# Patient Record
Sex: MALE | Race: OTHER | HISPANIC OR LATINO | Employment: FULL TIME | URBAN - METROPOLITAN AREA
[De-identification: names, ages, dates, MRNs, and addresses within clinical notes are randomized per-mention and may not be internally consistent; named-entity substitution may affect disease eponyms.]

---

## 2019-09-04 ENCOUNTER — HOSPITAL ENCOUNTER (EMERGENCY)
Facility: HOSPITAL | Age: 49
Discharge: HOME | End: 2019-09-04
Attending: EMERGENCY MEDICINE

## 2019-09-04 VITALS
DIASTOLIC BLOOD PRESSURE: 106 MMHG | TEMPERATURE: 98 F | SYSTOLIC BLOOD PRESSURE: 163 MMHG | HEART RATE: 82 BPM | RESPIRATION RATE: 18 BRPM | WEIGHT: 185.63 LBS | OXYGEN SATURATION: 97 %

## 2019-09-04 DIAGNOSIS — M62.838 TRAPEZIUS MUSCLE SPASM: Primary | ICD-10-CM

## 2019-09-04 PROCEDURE — 99283 EMERGENCY DEPT VISIT LOW MDM: CPT

## 2019-09-04 PROCEDURE — 99284 EMERGENCY DEPT VISIT MOD MDM: CPT | Performed by: EMERGENCY MEDICINE

## 2019-09-04 PROCEDURE — 20552 NJX 1/MLT TRIGGER POINT 1/2: CPT | Performed by: EMERGENCY MEDICINE

## 2019-09-04 RX ORDER — CYCLOBENZAPRINE HCL 10 MG
10 TABLET ORAL ONCE
Status: COMPLETED | OUTPATIENT
Start: 2019-09-04 | End: 2019-09-04

## 2019-09-04 RX ORDER — BUPIVACAINE HYDROCHLORIDE 5 MG/ML
5 INJECTION, SOLUTION EPIDURAL; INTRACAUDAL ONCE
Status: COMPLETED | OUTPATIENT
Start: 2019-09-04 | End: 2019-09-04

## 2019-09-04 RX ORDER — CYCLOBENZAPRINE HCL 10 MG
10 TABLET ORAL 2 TIMES DAILY PRN
Qty: 20 TABLET | Refills: 0 | Status: SHIPPED | OUTPATIENT
Start: 2019-09-04 | End: 2021-10-14 | Stop reason: ALTCHOICE

## 2019-09-04 RX ORDER — NAPROXEN 500 MG/1
500 TABLET ORAL 2 TIMES DAILY PRN
Qty: 15 TABLET | Refills: 0 | Status: SHIPPED | OUTPATIENT
Start: 2019-09-04 | End: 2020-11-20

## 2019-09-04 RX ORDER — LIDOCAINE HYDROCHLORIDE AND EPINEPHRINE 10; 10 MG/ML; UG/ML
20 INJECTION, SOLUTION INFILTRATION; PERINEURAL ONCE
Status: COMPLETED | OUTPATIENT
Start: 2019-09-04 | End: 2019-09-04

## 2019-09-04 RX ADMIN — BUPIVACAINE HYDROCHLORIDE 5 ML: 5 INJECTION, SOLUTION EPIDURAL; INTRACAUDAL; PERINEURAL at 09:44

## 2019-09-04 RX ADMIN — CYCLOBENZAPRINE HYDROCHLORIDE 10 MG: 10 TABLET, FILM COATED ORAL at 09:41

## 2019-09-04 RX ADMIN — LIDOCAINE HYDROCHLORIDE,EPINEPHRINE BITARTRATE 20 ML: 10; .01 INJECTION, SOLUTION INFILTRATION; PERINEURAL at 09:41

## 2019-09-04 NOTE — ED PROVIDER NOTES
History  Chief Complaint   Patient presents with    Neck Pain     complains of neck pain with radiation to back of head on left side X 3 days, denies any injury     62-year-old male presents chief complaint of left-sided neck pain radiating to the back of his head for the past 3 days  Patient reports he woke up from sleeping this way and has not had any significant relief since then  Patient has been taking ibuprofen at home without improvement  Pain is worse at night and after waking up in the morning  No fevers, chills or skin changes  No recent injury  Patient reports he had similar symptoms several years ago which resolved on their own  No weakness or numbness  History provided by:  Patient   used: No    Neck Pain   Pain location:  L side  Quality:  Aching and stiffness  Stiffness is present: At night  Pain severity:  Moderate  Pain is: Worse during the night  Onset quality:  Sudden  Duration:  3 days  Timing:  Constant  Progression:  Unchanged  Chronicity:  New  Relieved by:  Nothing  Worsened by:  Twisting  Ineffective treatments:  NSAIDs  Associated symptoms: no chest pain, no fever, no numbness, no paresis and no tingling    Risk factors: no recent head injury        None       Past Medical History:   Diagnosis Date    Hypertension        History reviewed  No pertinent surgical history  History reviewed  No pertinent family history  I have reviewed and agree with the history as documented  Social History     Tobacco Use    Smoking status: Never Smoker    Smokeless tobacco: Never Used   Substance Use Topics    Alcohol use: Never     Frequency: Never    Drug use: Never        Review of Systems   Constitutional: Negative for chills, diaphoresis and fever  Respiratory: Negative for shortness of breath  Cardiovascular: Negative for chest pain and palpitations  Gastrointestinal: Negative for diarrhea, nausea and vomiting     Genitourinary: Negative for dysuria and frequency  Musculoskeletal: Positive for neck pain  Skin: Negative for rash  Neurological: Negative for tingling and numbness  All other systems reviewed and are negative  Physical Exam  Physical Exam   Constitutional: He is oriented to person, place, and time  He appears well-developed and well-nourished  He appears distressed  HENT:   Head: Normocephalic and atraumatic  No pain with percussion of left mastoid process   Eyes: Pupils are equal, round, and reactive to light  EOM are normal    Neck: Normal range of motion  No JVD present  Cardiovascular: Normal rate, regular rhythm, normal heart sounds and intact distal pulses  Exam reveals no gallop and no friction rub  No murmur heard  Pulmonary/Chest: Effort normal and breath sounds normal  No respiratory distress  He has no wheezes  He has no rales  He exhibits no tenderness  Musculoskeletal: Normal range of motion  He exhibits tenderness (occipital origin of left trapezius muscle)  Neurological: He is alert and oriented to person, place, and time  Skin: Skin is warm and dry  Psychiatric: He has a normal mood and affect  His behavior is normal  Judgment and thought content normal    Nursing note and vitals reviewed        Vital Signs  ED Triage Vitals [09/04/19 0917]   Temperature Pulse Respirations Blood Pressure SpO2   98 °F (36 7 °C) 82 18 (!) 163/106 97 %      Temp src Heart Rate Source Patient Position - Orthostatic VS BP Location FiO2 (%)   -- -- -- -- --      Pain Score       9           Vitals:    09/04/19 0917   BP: (!) 163/106   Pulse: 82         Visual Acuity      ED Medications  Medications   lidocaine-epinephrine (XYLOCAINE/EPINEPHRINE) 1 %-1:100,000 injection 20 mL (20 mL Infiltration Given 9/4/19 0941)   cyclobenzaprine (FLEXERIL) tablet 10 mg (10 mg Oral Given 9/4/19 0941)   bupivacaine (PF) (MARCAINE) 0 5 % injection 5 mL (5 mL Injection Given 9/4/19 0944)       Diagnostic Studies  Results Reviewed     None No orders to display              Procedures  Trigger Point Injection  Date/Time: 9/4/2019 10:00 AM  Performed by: Lance Dinero MD  Authorized by: Lance Dinero MD     Patient location:  ED and bedside  Assisting Provider(s): No    Consent:     Consent obtained:  Verbal    Consent given by:  Patient    Risks discussed:  Pain    Alternatives discussed:  Alternative treatment  Indications:     Indications:  Trapezius Muscle Spasm and pain  Pre-procedure details:     Skin preparation:  Antiseptic wash  Anesthesia (see MAR for exact dosages): Anesthesia method:  Local infiltration    Local anesthetic:  Lidocaine 1% WITH epi and bupivacaine 0 5% w/o epi  Procedure Detail:     Equipment used:  10 ml syringe, 27 guage needle    Procedure note (site, laterality, method, findings): After prepping the skin with antiseptic wash pad, a wheal was raised utilizing a 27 gauge needle and syringe with a mixture of 80% 0 5% bupivacaine and 20% 1% lidocaine  After the wheal was raised approximately 4 mL of this solution was infiltrated into the area of greatest tenderness at the occipital origin of the left trapezius muscle  As needle was advanced gentle traction was applied to the plunger to assure no vascular injury  Patient tolerated procedure well with no complications  ED Course                               MDM  Number of Diagnoses or Management Options  Trapezius muscle spasm: new and does not require workup  Diagnosis management comments: Patient presents with signs symptoms of trapezius muscle strain/torticollis  Will do trigger point injection and prescribed muscle relaxant and NSAID therapy for home  Will give patient stretching recommendations      Risk of Complications, Morbidity, and/or Mortality  Management options: high    Patient Progress  Patient progress: improved      Disposition  Final diagnoses:   Trapezius muscle spasm     Time reflects when diagnosis was documented in both MDM as applicable and the Disposition within this note     Time User Action Codes Description Comment    9/4/2019 10:04 AM Jesusitadalton Jackie Add [[id number]] Trapezius muscle spasm       ED Disposition     ED Disposition Condition Date/Time Comment    Discharge Stable Wed Sep 4, 2019 10:04 AM Anup Patterson discharge to home/self care  Follow-up Information    None         Discharge Medication List as of 9/4/2019 10:05 AM      START taking these medications    Details   cyclobenzaprine (FLEXERIL) 10 mg tablet Take 1 tablet (10 mg total) by mouth 2 (two) times a day as needed for muscle spasms for up to 10 days, Starting Wed 9/4/2019, Until Sat 9/14/2019, Normal      naproxen (NAPROSYN) 500 mg tablet Take 1 tablet (500 mg total) by mouth 2 (two) times a day as needed for mild pain for up to 15 doses, Starting Wed 9/4/2019, Normal           No discharge procedures on file      ED Provider  Electronically Signed by           Julieta Chery MD  09/04/19 7524

## 2020-08-24 ENCOUNTER — TELEPHONE (OUTPATIENT)
Dept: FAMILY MEDICINE CLINIC | Facility: CLINIC | Age: 50
End: 2020-08-24

## 2020-08-24 NOTE — TELEPHONE ENCOUNTER
Called pt to confirm appt tomorrow L/M with wife to return my call    [facility]

## 2020-08-25 ENCOUNTER — OFFICE VISIT (OUTPATIENT)
Dept: FAMILY MEDICINE CLINIC | Facility: CLINIC | Age: 50
End: 2020-08-25

## 2020-08-25 VITALS
BODY MASS INDEX: 26.05 KG/M2 | OXYGEN SATURATION: 97 % | SYSTOLIC BLOOD PRESSURE: 130 MMHG | WEIGHT: 182 LBS | HEIGHT: 70 IN | HEART RATE: 80 BPM | DIASTOLIC BLOOD PRESSURE: 80 MMHG | RESPIRATION RATE: 16 BRPM | TEMPERATURE: 98.1 F

## 2020-08-25 DIAGNOSIS — Z02.4 ENCOUNTER FOR COMMERCIAL DRIVER MEDICAL EXAMINATION (CDME): Primary | ICD-10-CM

## 2020-08-25 LAB
SL AMB  POCT GLUCOSE, UA: NORMAL
SL AMB LEUKOCYTE ESTERASE,UA: NORMAL
SL AMB POCT BILIRUBIN,UA: NORMAL
SL AMB POCT BLOOD,UA: NORMAL
SL AMB POCT CLARITY,UA: CLEAR
SL AMB POCT COLOR,UA: YELLOW
SL AMB POCT KETONES,UA: NORMAL
SL AMB POCT NITRITE,UA: NORMAL
SL AMB POCT PH,UA: 5
SL AMB POCT SPECIFIC GRAVITY,UA: 1.01
SL AMB POCT URINE PROTEIN: NORMAL
SL AMB POCT UROBILINOGEN: NORMAL

## 2020-08-25 PROCEDURE — 81002 URINALYSIS NONAUTO W/O SCOPE: CPT | Performed by: NURSE PRACTITIONER

## 2020-08-25 PROCEDURE — 3008F BODY MASS INDEX DOCD: CPT | Performed by: NURSE PRACTITIONER

## 2020-08-25 PROCEDURE — 99499 UNLISTED E&M SERVICE: CPT | Performed by: NURSE PRACTITIONER

## 2020-08-25 RX ORDER — LISINOPRIL 10 MG/1
10 TABLET ORAL DAILY
COMMUNITY
End: 2020-11-20 | Stop reason: SDUPTHER

## 2020-08-25 NOTE — PROGRESS NOTES
BMI Counseling: Body mass index is 26 11 kg/m²  The BMI is above normal  Nutrition recommendations include decreasing portion sizes, encouraging healthy choices of fruits and vegetables, decreasing fast food intake, consuming healthier snacks, limiting drinks that contain sugar, moderation in carbohydrate intake, increasing intake of lean protein, reducing intake of saturated and trans fat and reducing intake of cholesterol  Exercise recommendations include vigorous physical activity 75 minutes/week, exercising 3-5 times per week, obtaining a gym membership and strength training exercises  No pharmacotherapy was ordered  BMI Counseling: Body mass index is 26 11 kg/m²  Follow-up plan was not completed due to patient refusing BMI follow-up plan  Body mass index is 26 11 kg/m²  Follow-up plan was not completed due to patient being in urgent or emergent medical situation  Depression Screening and Follow-up Plan: Patient not screened for depression due to medical reason (urgent/emergent situation, decreased capacity)  Assessment/Plan:         Problem List Items Addressed This Visit        Other    Encounter for  medical examination (CDME) - Primary    Relevant Orders    POCT urine dip (Completed)            Subjective:      Patient ID: Yobany Perez is a 53 y o  male  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       The following portions of the patient's history were reviewed and updated as appropriate:   He has a past medical history of Hypertension  ,  does not have any pertinent problems on file  ,   has no past surgical history on file  ,  family history is not on file  ,   reports that he has never smoked  He has never used smokeless tobacco  He reports that he does not drink alcohol or use drugs  ,  has No Known Allergies     Current Outpatient Medications   Medication Sig Dispense Refill    lisinopril (ZESTRIL) 10 mg tablet Take 10 mg by mouth daily      cyclobenzaprine (FLEXERIL) 10 mg tablet Take 1 tablet (10 mg total) by mouth 2 (two) times a day as needed for muscle spasms for up to 10 days 20 tablet 0    naproxen (NAPROSYN) 500 mg tablet Take 1 tablet (500 mg total) by mouth 2 (two) times a day as needed for mild pain for up to 15 doses (Patient not taking: Reported on 8/25/2020) 15 tablet 0     No current facility-administered medications for this visit  Review of Systems   Constitutional: Negative for appetite change, chills, fatigue and fever  HENT: Negative for congestion, ear pain, postnasal drip, rhinorrhea, sinus pain and sore throat  Eyes: Negative for pain, redness and visual disturbance  Respiratory: Negative for cough and shortness of breath  Cardiovascular: Negative for chest pain  Gastrointestinal: Negative for abdominal pain, diarrhea, nausea and vomiting  Genitourinary: Negative for dysuria and hematuria  Musculoskeletal: Negative for arthralgias  Skin: Negative  Allergic/Immunologic: Negative for environmental allergies and food allergies  Neurological: Negative  Hematological: Negative  Psychiatric/Behavioral: Negative  Objective:  Vitals:    08/25/20 1011   BP: 130/80   BP Location: Left arm   Patient Position: Sitting   Cuff Size: Standard   Pulse: 80   Resp: 16   Temp: 98 1 °F (36 7 °C)   TempSrc: Temporal   SpO2: 97%   Weight: 82 6 kg (182 lb)   Height: 5' 10" (1 778 m)     Body mass index is 26 11 kg/m²  Physical Exam  Vitals signs and nursing note reviewed  Constitutional:       Appearance: Normal appearance  He is well-developed and normal weight  HENT:      Head: Normocephalic and atraumatic  Right Ear: Tympanic membrane, ear canal and external ear normal       Left Ear: Tympanic membrane, ear canal and external ear normal       Mouth/Throat:      Mouth: Mucous membranes are moist    Eyes:      Extraocular Movements: Extraocular movements intact        Conjunctiva/sclera: Conjunctivae normal  Pupils: Pupils are equal, round, and reactive to light  Neck:      Musculoskeletal: Normal range of motion  Cardiovascular:      Rate and Rhythm: Normal rate and regular rhythm  Pulses: Normal pulses  Heart sounds: Normal heart sounds  No murmur  Pulmonary:      Effort: Pulmonary effort is normal       Breath sounds: Normal breath sounds  Abdominal:      General: Bowel sounds are normal       Palpations: Abdomen is soft  Musculoskeletal: Normal range of motion  Skin:     General: Skin is warm  Capillary Refill: Capillary refill takes less than 2 seconds  Neurological:      Mental Status: He is alert and oriented to person, place, and time     Psychiatric:         Mood and Affect: Mood normal          Behavior: Behavior normal

## 2020-08-25 NOTE — PATIENT INSTRUCTIONS
Medical Examiner's Certificate    I certify that I have examined Courtney Wells  In accordance with the [employer] (01   97) and with knowledge of the driving duties, I find this person is qualified; and, if applicable, only when:      Wearing corrective lenses  The information I have provided regarding this physical examination is true and complete   A complete examination form with any attachment embodies my findings completely and correctly, and is on file in my office       _________________________________________  ALYSE Gould  8/25/2020    __________________________________________ _______________________ _____   Signature of  s License No  State       Address of   [address] [place] [address] Date  8/25/2021

## 2020-11-20 ENCOUNTER — OFFICE VISIT (OUTPATIENT)
Dept: FAMILY MEDICINE CLINIC | Facility: OTHER | Age: 50
End: 2020-11-20
Payer: COMMERCIAL

## 2020-11-20 VITALS
DIASTOLIC BLOOD PRESSURE: 100 MMHG | HEIGHT: 69 IN | HEART RATE: 62 BPM | BODY MASS INDEX: 27.11 KG/M2 | SYSTOLIC BLOOD PRESSURE: 150 MMHG | TEMPERATURE: 97.6 F | WEIGHT: 183 LBS

## 2020-11-20 DIAGNOSIS — Z13.220 SCREENING, LIPID: Primary | ICD-10-CM

## 2020-11-20 DIAGNOSIS — I10 ESSENTIAL HYPERTENSION: ICD-10-CM

## 2020-11-20 DIAGNOSIS — Z11.4 SCREENING FOR HIV (HUMAN IMMUNODEFICIENCY VIRUS): ICD-10-CM

## 2020-11-20 DIAGNOSIS — Z13.29 SCREENING FOR THYROID DISORDER: ICD-10-CM

## 2020-11-20 DIAGNOSIS — Z13.1 SCREENING FOR DIABETES MELLITUS: ICD-10-CM

## 2020-11-20 PROCEDURE — 3077F SYST BP >= 140 MM HG: CPT | Performed by: FAMILY MEDICINE

## 2020-11-20 PROCEDURE — 3008F BODY MASS INDEX DOCD: CPT | Performed by: FAMILY MEDICINE

## 2020-11-20 PROCEDURE — 1036F TOBACCO NON-USER: CPT | Performed by: FAMILY MEDICINE

## 2020-11-20 PROCEDURE — 3080F DIAST BP >= 90 MM HG: CPT | Performed by: FAMILY MEDICINE

## 2020-11-20 PROCEDURE — 99203 OFFICE O/P NEW LOW 30 MIN: CPT | Performed by: FAMILY MEDICINE

## 2020-11-20 RX ORDER — LISINOPRIL 10 MG/1
10 TABLET ORAL DAILY
Qty: 90 TABLET | Refills: 0 | Status: SHIPPED | OUTPATIENT
Start: 2020-11-20 | End: 2021-02-22 | Stop reason: SDUPTHER

## 2020-12-28 ENCOUNTER — LAB (OUTPATIENT)
Dept: LAB | Facility: CLINIC | Age: 50
End: 2020-12-28
Payer: COMMERCIAL

## 2020-12-28 ENCOUNTER — TRANSCRIBE ORDERS (OUTPATIENT)
Dept: LAB | Facility: CLINIC | Age: 50
End: 2020-12-28

## 2020-12-28 DIAGNOSIS — I10 ESSENTIAL HYPERTENSION: ICD-10-CM

## 2020-12-28 DIAGNOSIS — Z11.4 SCREENING FOR HIV (HUMAN IMMUNODEFICIENCY VIRUS): ICD-10-CM

## 2020-12-28 DIAGNOSIS — Z13.220 SCREENING, LIPID: ICD-10-CM

## 2020-12-28 DIAGNOSIS — Z13.29 SCREENING FOR THYROID DISORDER: ICD-10-CM

## 2020-12-28 DIAGNOSIS — Z13.1 SCREENING FOR DIABETES MELLITUS: ICD-10-CM

## 2020-12-28 LAB
ALBUMIN SERPL BCP-MCNC: 4 G/DL (ref 3.5–5)
ALP SERPL-CCNC: 89 U/L (ref 46–116)
ALT SERPL W P-5'-P-CCNC: 65 U/L (ref 12–78)
ANION GAP SERPL CALCULATED.3IONS-SCNC: 6 MMOL/L (ref 4–13)
AST SERPL W P-5'-P-CCNC: 18 U/L (ref 5–45)
BASOPHILS # BLD AUTO: 0.14 THOUSANDS/ΜL (ref 0–0.1)
BASOPHILS NFR BLD AUTO: 2 % (ref 0–1)
BILIRUB SERPL-MCNC: 0.28 MG/DL (ref 0.2–1)
BUN SERPL-MCNC: 12 MG/DL (ref 5–25)
CALCIUM SERPL-MCNC: 9.1 MG/DL (ref 8.3–10.1)
CHLORIDE SERPL-SCNC: 101 MMOL/L (ref 100–108)
CHOLEST SERPL-MCNC: 256 MG/DL (ref 50–200)
CO2 SERPL-SCNC: 30 MMOL/L (ref 21–32)
CREAT SERPL-MCNC: 0.96 MG/DL (ref 0.6–1.3)
EOSINOPHIL # BLD AUTO: 0.36 THOUSAND/ΜL (ref 0–0.61)
EOSINOPHIL NFR BLD AUTO: 4 % (ref 0–6)
ERYTHROCYTE [DISTWIDTH] IN BLOOD BY AUTOMATED COUNT: 12.4 % (ref 11.6–15.1)
EST. AVERAGE GLUCOSE BLD GHB EST-MCNC: 117 MG/DL
GFR SERPL CREATININE-BSD FRML MDRD: 92 ML/MIN/1.73SQ M
GLUCOSE P FAST SERPL-MCNC: 101 MG/DL (ref 65–99)
HBA1C MFR BLD: 5.7 %
HCT VFR BLD AUTO: 50.2 % (ref 36.5–49.3)
HDLC SERPL-MCNC: 50 MG/DL
HGB BLD-MCNC: 16.3 G/DL (ref 12–17)
IMM GRANULOCYTES # BLD AUTO: 0.06 THOUSAND/UL (ref 0–0.2)
IMM GRANULOCYTES NFR BLD AUTO: 1 % (ref 0–2)
LDLC SERPL CALC-MCNC: 170 MG/DL (ref 0–100)
LYMPHOCYTES # BLD AUTO: 2.58 THOUSANDS/ΜL (ref 0.6–4.47)
LYMPHOCYTES NFR BLD AUTO: 27 % (ref 14–44)
MCH RBC QN AUTO: 28.7 PG (ref 26.8–34.3)
MCHC RBC AUTO-ENTMCNC: 32.5 G/DL (ref 31.4–37.4)
MCV RBC AUTO: 88 FL (ref 82–98)
MONOCYTES # BLD AUTO: 1.03 THOUSAND/ΜL (ref 0.17–1.22)
MONOCYTES NFR BLD AUTO: 11 % (ref 4–12)
NEUTROPHILS # BLD AUTO: 5.45 THOUSANDS/ΜL (ref 1.85–7.62)
NEUTS SEG NFR BLD AUTO: 55 % (ref 43–75)
NONHDLC SERPL-MCNC: 206 MG/DL
NRBC BLD AUTO-RTO: 0 /100 WBCS
PLATELET # BLD AUTO: 262 THOUSANDS/UL (ref 149–390)
PMV BLD AUTO: 10.5 FL (ref 8.9–12.7)
POTASSIUM SERPL-SCNC: 4.8 MMOL/L (ref 3.5–5.3)
PROT SERPL-MCNC: 8.1 G/DL (ref 6.4–8.2)
RBC # BLD AUTO: 5.68 MILLION/UL (ref 3.88–5.62)
SODIUM SERPL-SCNC: 137 MMOL/L (ref 136–145)
TRIGL SERPL-MCNC: 180 MG/DL
TSH SERPL DL<=0.05 MIU/L-ACNC: 0.68 UIU/ML (ref 0.36–3.74)
WBC # BLD AUTO: 9.62 THOUSAND/UL (ref 4.31–10.16)

## 2020-12-28 PROCEDURE — 84443 ASSAY THYROID STIM HORMONE: CPT

## 2020-12-28 PROCEDURE — 85025 COMPLETE CBC W/AUTO DIFF WBC: CPT

## 2020-12-28 PROCEDURE — 87389 HIV-1 AG W/HIV-1&-2 AB AG IA: CPT

## 2020-12-28 PROCEDURE — 80061 LIPID PANEL: CPT

## 2020-12-28 PROCEDURE — 80053 COMPREHEN METABOLIC PANEL: CPT

## 2020-12-28 PROCEDURE — 36415 COLL VENOUS BLD VENIPUNCTURE: CPT

## 2020-12-28 PROCEDURE — 83036 HEMOGLOBIN GLYCOSYLATED A1C: CPT

## 2020-12-29 ENCOUNTER — TELEPHONE (OUTPATIENT)
Dept: FAMILY MEDICINE CLINIC | Facility: OTHER | Age: 50
End: 2020-12-29

## 2020-12-29 LAB — HIV 1+2 AB+HIV1 P24 AG SERPL QL IA: NORMAL

## 2020-12-29 NOTE — RESULT ENCOUNTER NOTE
Can we please inform patient that lab work looks stable  Blood counts, kidney, liver and thyroid function are all normal  Hemoglobin A1C is 5 7, Prediabetic range and cholesterol levels elevated all overall  LDL "bad cholesterol" is high  I would like patient to begin with lifestyle modifications including healthy eating 3-5 servings/day of fruits and vegetables, as well as, exercise 150mins/week  Please have patient make an appointment for 4-6 months, to check-in and see his progress  Additionally, patient was supposed to call the office back, 2 weeks after his previous appointment to let me know how his blood pressure readings were  Can we also check-in about this? Thanks so much!   Dr Benedict Hand

## 2021-02-22 DIAGNOSIS — I10 ESSENTIAL HYPERTENSION: ICD-10-CM

## 2021-02-26 RX ORDER — LISINOPRIL 10 MG/1
10 TABLET ORAL DAILY
Qty: 90 TABLET | Refills: 0 | Status: SHIPPED | OUTPATIENT
Start: 2021-02-26 | End: 2021-05-24 | Stop reason: SDUPTHER

## 2021-04-06 DIAGNOSIS — Z23 ENCOUNTER FOR IMMUNIZATION: ICD-10-CM

## 2021-04-14 ENCOUNTER — IMMUNIZATIONS (OUTPATIENT)
Dept: FAMILY MEDICINE CLINIC | Facility: HOSPITAL | Age: 51
End: 2021-04-14

## 2021-04-14 DIAGNOSIS — Z23 ENCOUNTER FOR IMMUNIZATION: Primary | ICD-10-CM

## 2021-04-14 PROCEDURE — 91300 SARS-COV-2 / COVID-19 MRNA VACCINE (PFIZER-BIONTECH) 30 MCG: CPT

## 2021-04-14 PROCEDURE — 0001A SARS-COV-2 / COVID-19 MRNA VACCINE (PFIZER-BIONTECH) 30 MCG: CPT

## 2021-05-09 ENCOUNTER — IMMUNIZATIONS (OUTPATIENT)
Dept: FAMILY MEDICINE CLINIC | Facility: HOSPITAL | Age: 51
End: 2021-05-09

## 2021-05-09 DIAGNOSIS — Z23 ENCOUNTER FOR IMMUNIZATION: Primary | ICD-10-CM

## 2021-05-09 PROCEDURE — 0002A SARS-COV-2 / COVID-19 MRNA VACCINE (PFIZER-BIONTECH) 30 MCG: CPT

## 2021-05-09 PROCEDURE — 91300 SARS-COV-2 / COVID-19 MRNA VACCINE (PFIZER-BIONTECH) 30 MCG: CPT

## 2021-05-24 DIAGNOSIS — I10 ESSENTIAL HYPERTENSION: ICD-10-CM

## 2021-05-27 RX ORDER — LISINOPRIL 10 MG/1
10 TABLET ORAL DAILY
Qty: 90 TABLET | Refills: 0 | Status: SHIPPED | OUTPATIENT
Start: 2021-05-27 | End: 2021-08-30 | Stop reason: SDUPTHER

## 2021-07-19 ENCOUNTER — OFFICE VISIT (OUTPATIENT)
Dept: FAMILY MEDICINE CLINIC | Facility: OTHER | Age: 51
End: 2021-07-19
Payer: COMMERCIAL

## 2021-07-19 VITALS
HEART RATE: 74 BPM | WEIGHT: 186.6 LBS | OXYGEN SATURATION: 98 % | DIASTOLIC BLOOD PRESSURE: 82 MMHG | BODY MASS INDEX: 27.64 KG/M2 | SYSTOLIC BLOOD PRESSURE: 124 MMHG | TEMPERATURE: 97.8 F | HEIGHT: 69 IN

## 2021-07-19 DIAGNOSIS — I10 ESSENTIAL HYPERTENSION: Primary | ICD-10-CM

## 2021-07-19 DIAGNOSIS — Z12.12 SCREENING FOR COLORECTAL CANCER: ICD-10-CM

## 2021-07-19 DIAGNOSIS — R07.9 CHEST PAIN, UNSPECIFIED TYPE: ICD-10-CM

## 2021-07-19 DIAGNOSIS — E78.2 MODERATE MIXED HYPERLIPIDEMIA NOT REQUIRING STATIN THERAPY: ICD-10-CM

## 2021-07-19 DIAGNOSIS — Z12.11 SCREENING FOR COLORECTAL CANCER: ICD-10-CM

## 2021-07-19 PROCEDURE — 99213 OFFICE O/P EST LOW 20 MIN: CPT | Performed by: FAMILY MEDICINE

## 2021-07-19 PROCEDURE — 3079F DIAST BP 80-89 MM HG: CPT | Performed by: FAMILY MEDICINE

## 2021-07-19 PROCEDURE — 1036F TOBACCO NON-USER: CPT | Performed by: FAMILY MEDICINE

## 2021-07-19 PROCEDURE — 3725F SCREEN DEPRESSION PERFORMED: CPT | Performed by: FAMILY MEDICINE

## 2021-07-19 PROCEDURE — 3074F SYST BP LT 130 MM HG: CPT | Performed by: FAMILY MEDICINE

## 2021-07-19 PROCEDURE — 3008F BODY MASS INDEX DOCD: CPT | Performed by: FAMILY MEDICINE

## 2021-07-19 NOTE — PATIENT INSTRUCTIONS
Patient is to start walking 1-2x/week for at least 20 mins  DASH diet       Plan de alimentación con "enfoque dietético para detener la hipertensión (DASH, por ginger siglas en inglés)   LO QUE NECESITA SABER:   El plan de alimentación DASH está diseñado para ayudar a prevenir o disminuir la hipertensión  También puede ayudar a bajar el colesterol nikole (colesterol LDL) y disminuír bella riesgo de enfermedad cardíaca  El plan es bajo en sodio, azúcar, grasas dañinas, y grasas en bella totalidad  Es alto en potasio, calcio, magnesio y Ifeoma  Estos nutrientes se agregan al consumir más frutas, vegetales y granos enteros  INSTRUCCIONES SOBRE EL [facility]ITAL[facility]:   Bella límite de sodio cada día: Bella dietista le indicará la cantidad de sodio que usted debe consumir a diario  La gente que tiene la presión arterial ashley debe consumir de 1,500 a 2,300 mg de sodio al día brandy fadi  James cucharadita (cdta) de sal tiene 2,300 mg de sodio  [place] puede parecer brandy james meta difícil, en pequeños cambios en los alimentos que usted consume pueden hacer james gran diferencia  Bella médico o dietista puede ayudarlo a crear un plan alimenticio que cumpla bella límite de sodio  Formas de limitar el sodio:  · Tail las etiquetas de los alimentos  Las etiquetas pueden ayudarle a escoger alimentos bajos en sodio  La cantidad de sodio está incluida en miligramos (mg)  La columna del porcentaje de valor diario indica la cantidad de necesidades diarias satisfechas con 1 porción del alimento para cada nutriente en la lista  Escoja alimentos que tengan menos de 5% del porcentaje diario de [place]  Estos alimentos se consideran bajos en sodio  Los alimentos que tienen 20% o más del porcentaje diario de sodio se consideran alimentos altos en sodio  Evite alimentos que tengan más de 300 mg de sodio por porción  Escoja alimentos Shelli Spanner diga que son bajos en sodio, con sodio reducido, o sin sal agregada           · Evite la sal  No le agregue sal a la comida cuando se siente a la martinez a comer, y agregue muy poca sal a los alimentos tucker la cocción  Use hierbas y condimentos, brandy cebollas, ajo y especias sin sal para agregar sabor a los alimentos  Use jugo de lima, david o vinagre para darle a los alimentos un sabor ácido  Use chiles picantes o james cantidad pequeña de salsa picante para agregar un sabor picante a los alimentos  · Pregunte sobre los sustitutos para la sal  Pregúntele a bella médico si es posible usar sustitutos de la sal  Algunos sustitutos de la sal vienen con ingredientes que pueden ser dañinos si usted tiene ciertos padecimientos médicos  · Escoja los alimentos cuidadosamente cuando sale a comer a restaurantes: las comidas de los restaurantes, sobre todo restaurantes de comida rápida, suze siempre son altas en sodio  Algunos restaurantes ofrecen información nutricional que indica la cantidad de sodio en ginger alimentos  Pida que preparen ginger comidas con menos sal o sin sal     Lo que necesita saber acerca de las grasas:  · Incluya grasas saludables  Las grasas insaturadas y los ácidos grasos omega-3 son ejemplos de grasas saludables  Las grasas insaturadas se encuentran en los aceites de soja, canola, [place] o [place] y la margarina [place]n Olp y [facility]  Los ácidos grasos [place] 3 se encuentran en el pescado con grasa, brandy el salmón, el atún, la caballa y las adali  También se le puede encontrar en el aceita de linaza y en la linaza molida  · Evite las grasas insalubres  No consuma grasas dañinas, brandy grasas saturadas y grasas trans  Las grasas saturadas se encuentran en alimentos que contienen grasa animal  Lizz Jackhorn son Valentina Mustache grasosas, la [place], la [place], la crema y otros productos lácteos   Además se pueden encontrar en la [place], la margarina en zaida, el aceite de tony y el aceite de bhavana  Las grasas trans se encuentran en comidas fritas, galletas estilo soda, tortillitas tostadas, y alimentos horneados hechos con margarina o manteca  Alimentos que puede incluir: Con el plan de alimentación DASH, usted necesita consumir un número específico de porciones de cada jorge de alimentos  [place] le ayudará a consumir las cantidades suficientes de ciertos nutrientes y limitar otros  La cantidad de porciones que usted debe comer depende de la cantidad de calorías que usted necesita  [place] dietista le informará sobre cuántas calorías necesita usted  El número de porciones que viene en la lista al lado de los grupos alimenticios a continuación es para las personas que necesitan aproximadamente 2,000 calorías al día  · Granos: 6 a 8 porciones (3 de estas porciones deben ser de alimentos de granos enteros o integrales)    ? 1 tajada de pan integral    ? 1 onza de cereal seco    ? ½ taza de cereal cocinado, pasta, o arroz integral    · Verduras y frutas: 4 a 5 porciones de frutas y 4 a 5 porciones de vegetales    ? 1 fruta mediana    ? ½ taza de vegetales congelados, enlatados (sin sal adicional), o vegetales frescos y picados    ? ½ taza de fruta fresca, congelada, seca o enlatada (enlatada en sirope liviano o jugo de fruta)    ? ½ taza de jugo de verduras o frutas    · Productos lácteos: 2 a 3 porciones    ? [address] 1%    ? 1½ onzas de queso descremado o bajo en grasas y bajo en sodio    ? 6 onzas de yogurt descremado o bajo en grasas    · Awilda wolff, awilda yves y pescado magros: 6 onzas o menos    ? Awilda yves (michelle, pavo) sin piel    ? Pescado (sobre todo pescado con grasa, brandy salmón, atún fresco o [place])    ? Carne de res o de cerdo magra (Jennifer Devi extra Kyle Mariee)    ? Claras de huevo y sustitutos del huevo    · [place] du sac, semillas y legumbres: 4 a 5 porciones por semana    ? ½ taza de frijoles y arbejas cocinadas    ? 1½ onzas de nueces sin sal    ? 2 cucharadas de mantequilla de maní o semillas    · Dulces y azúcares adicionales: 5 o menos por semana    ?  1 cucharada de azúcar, Robert Gustafson    ? ½ taza de sorbeto o gelatina    ? 1 taza de limonada    · Grasas: 2 a 3 porciones por semana    ? 1 cucharadita de margarina suave o aceite vegetal    ? 1 cucharada de mayonesa    ? 2 cucharadas de aderezo para [place] [employer] se deben evitar:  · Granos:     ? Postres horneados o fritos brandy donas, pastelitos, galletas, y quequitos (altos en grasas y azúcar)    ? Mezclas para hacer pan de maíz y pasteles, alimentos empacados, brandy rellenos para pavo, mezclas para hacer arroz y pasta, macarrones con Brit-barre, y cereales instantáneos (altos en sodio)    · Anthony Brand y verduras:     ? Vegetales regulares enlatados (altos en sodio)    ? Repollo preparado en vinagre, vegetales en vinagre, y otros alimentos preparados en escabeche (altos en sodio)    ? Vegetales fritos o vegetales en mantequilla o salsas altas en grasas    ? Anthony Brand en crema o salsa de mantequilla (altas en grasas)    · Productos lácteos:     ? Leche entera, leche semi descremada (2%), y crema (tia en grasas)    ? Queso regular y [place]-barre procesado (alto en grasa y [place])    · Awilda y alimentos con proteínas:     ? Awilda ahumadas o curadas, brandy carne de keri en conserva, tocino, jamón, perros calientes, y salchicha (tia en grasas y [place])    ? Frijoles enlatados y awilda enlatadas o awilda en pasta, brandy awilda en conserva, adali, anchoas y mariscos de imitación (altos en sodio)    ? Awilda para emparedados, brandy Nadia, [place], [place], y carne de res en rebanada (altos en sodio)    ? Awilda altas en grasas (bistec estilo T-bone, carne molida para hamburguesas, costillas)    ? Huevos enteros y yemas de huevo (altos en grasas)    · Otros:     ? Condimentos hechos con sal, brandy sal de ajo, sal de apio, sal de cebolla, sal condimentada, suavizantes para awilda, y glutamato de monosodio (MSG, por ginger siglas en inglés)    ? Sopa Miso y mezclas para sopa enlatadas o secas (altas en sodio)    ?  Salsa de soya regular, salsa de [place], salsa [place], salsa para bistec, [address], y la mayoría de las vinagres con sabor (altas en sodio)    ? Condimentos regulares, brandy mostaza, salsa de tomate y aderezos para ensalada (altos en sodio)    ? Salsa para law y salsas en general, brandy salsa Mikal o de queso (altas en sodio y [place])    ? Bebidas altas en azúcar, brandy bebidas gaseosas o jugos de frutas    ? Alimentos para merendar, brandy pk tostadas, palomitas de [place], pretzels, piel de cerdo, galletas de soda saladas, y nueces saladas    ? Alimentos congelados, brandy cenas preparadas, platos principales, vegetales con salsas, y law cubiertas en pan (altas en sodio)    Otras pautas que debe seguir:  · Mantenga un peso saludable  Bella riesgo de enfermedad cardíaca es aún más alto si usted tiene sobrepeso  Bella médico podría sugerirle que adelgace si tiene sobrepeso  Usted puede perder peso si se propone consumir menos calorías y alimentos que tengan azúcar y grasas agregadas  El plan de alimentación DASH puede ayudarle a lograrlo  Alva buena forma de disminuir el consumo de calorías es consumiendo porciones más pequeñas en cada comida y menos meriendas entre comidas  Consulte a bella médico para obtener más información sobre cómo adelgazar  · Realice actividad física con regularidad  El ejercicio regular puede ayudarle a alcanzar o mantener un peso saludable  El ejercicio regular también puede ayudarle a disminuir bella presión arterial y mejorar ginger niveles de colesterol  Lauren ejercicios moderados por 30 minutos o más todos los días de la [place]  Para bajar peso, asegúrese de ejercitarse por lo menos 60 minutos  Consulte con bella médico sobre un programa de ejercicio adecuado para usted  · Limite el consumo de alcohol  Las mujeres deberían limitar el consumo de alcohol a 1 bebida por día  Los hombres deberían limitar el consumo de alcohol a 2 tragos al día   Un trago equivale a 12 onzas de cerveza, 5 onzas de vino o 1 onza y ½ de licor  © Copyright [address] [place] [address] Information is for End User's use only and may not be sold, redistributed or otherwise used for commercial purposes  All illustrations and images included in CareNotes® are the copyrighted property of A D A M , Inc  or [address] [place] [address] es sólo para uso en educación  Bella intención no es darle un consejo médico sobre enfermedades o tratamientos  Colsulte con bella Alejandro Shila farmacéutico antes de seguir cualquier régimen médico para saber si es seguro y efectivo para usted  Books:  "Salt, Sugar, Fat" - Srinivasan Najera    Videos:  "[employer]"    Websites:  www nutritionfacts  org  www  [employer]  com

## 2021-07-19 NOTE — PROGRESS NOTES
Assessment/Plan:     Diagnoses and all orders for this visit:    Essential hypertension  - Blood pressure well controlled  - Continue current regimen, 10mg lisinopril daily    Screening for colorectal cancer  -     Ambulatory referral to Gastroenterology; Future    Chest pain, unspecified type  - Patient reports episodes of chest tightness and SOB last week while walking up stairs  - Reports concern of CVD as he has a strong fhx of heart disease, will obtain baseline stress EKG to r/o underlying ischemia  - Stress test only, exercise; Future    Moderate mixed hyperlipidemia not requiring statin therapy  - Cholesterol levels elevated all overall  LDL "bad cholesterol" is high,  on recent lab work  - Discussed with patient to begin lifestyle modifications including healthy eating 3-5 servings/day of fruits and vegetables, as well as, exercise 150mins/week and is amenable     Nutrition Assessment and Intervention:     Online resources such as NutritionFacts  Ludmila Cheslea  com, plant[employer] com, [employer], or similar provided to patient    Nutrition-related book recommendations provided to patient    Nutrition-related movie recommendations provided to patient      Physical Activity Assessment and Intervention:    Physical activity online resources/apps provided to patient        Subjective:      Patient ID: Jamal Ferrer is a 49 y o  male  Hypertension  This is a chronic problem  The current episode started more than 1 year ago  The problem has been gradually improving since onset  The problem is controlled (120/80s)  Associated symptoms include chest pain and shortness of breath  Pertinent negatives include no blurred vision, headaches, palpitations or sweats  (Patient reports that last week he experienced an episode of generalized precordial chest tightness that occurred as he was walking up his stairs  Patient states he also had intermittent SOB during this episode    He states the episode resolved within 10 mins, and states he took a baby ASA  Patient denies any numbness, chest-pressure, or weakness of the left arm) There are no associated agents to hypertension  Risk factors: Prediabetes  Past treatments include ACE inhibitors and lifestyle changes  The current treatment provides moderate improvement  There is no history of CAD/MI or CVA  There is no history of a thyroid problem  Reviewed recent lab work with patient and discussed with him that Hemoglobin A1C is 5 7% which is prediabetes  Also discussed that his LDL or "bad cholesterol" was elevated  The following portions of the patient's history were reviewed and updated as appropriate: allergies, current medications, past family history, past medical history, past social history, past surgical history and problem list     Review of Systems   Constitutional: Negative for diaphoresis  Eyes: Negative for blurred vision and visual disturbance  Respiratory: Positive for shortness of breath  Cardiovascular: Positive for chest pain  Negative for palpitations  Gastrointestinal: Negative for constipation, diarrhea, nausea and vomiting  Endocrine: Negative for polydipsia, polyphagia and polyuria  Neurological: Negative for dizziness, weakness, numbness and headaches  Objective:      /82 (BP Location: Left arm, Patient Position: Sitting, Cuff Size: Adult)   Pulse 74   Temp 97 8 °F (36 6 °C) (Temporal)   Ht 5' 9" (1 753 m)   Wt 84 6 kg (186 lb 9 6 oz)   SpO2 98%   BMI 27 56 kg/m²          Physical Exam  Vitals reviewed  Constitutional:       Appearance: He is well-developed  HENT:      Head: Normocephalic and atraumatic  Nose: Nose normal    Eyes:      Conjunctiva/sclera: Conjunctivae normal    Cardiovascular:      Rate and Rhythm: Normal rate and regular rhythm  Heart sounds: Normal heart sounds  Pulmonary:      Effort: Pulmonary effort is normal       Breath sounds: Normal breath sounds  Abdominal:      General: Bowel sounds are normal       Palpations: Abdomen is soft  Musculoskeletal:         General: Normal range of motion  Cervical back: Normal range of motion and neck supple  Skin:     General: Skin is warm and dry  Neurological:      Mental Status: He is alert and oriented to person, place, and time

## 2021-07-20 ENCOUNTER — TELEPHONE (OUTPATIENT)
Dept: FAMILY MEDICINE CLINIC | Facility: OTHER | Age: 51
End: 2021-07-20

## 2021-07-20 PROBLEM — Z02.4 ENCOUNTER FOR COMMERCIAL DRIVER MEDICAL EXAMINATION (CDME): Status: RESOLVED | Noted: 2020-08-25 | Resolved: 2021-07-20

## 2021-07-20 PROBLEM — E78.2 MODERATE MIXED HYPERLIPIDEMIA NOT REQUIRING STATIN THERAPY: Status: ACTIVE | Noted: 2021-07-20

## 2021-07-20 NOTE — TELEPHONE ENCOUNTER
Patients wife called and is requesting blood work orders be placed  Patient states he talked to you regarding this   Please advise

## 2021-07-21 NOTE — TELEPHONE ENCOUNTER
I will order lab work at patient's next visit after he has continued with the lifestyle modifications at his last visit  All lab work from 6 months ago was reviewed with patient during visit and is stable  We will order repeat labs when he returns!     Thanks,  Dr GONZALEZ

## 2021-07-26 ENCOUNTER — HOSPITAL ENCOUNTER (OUTPATIENT)
Dept: NON INVASIVE DIAGNOSTICS | Facility: CLINIC | Age: 51
Discharge: HOME | End: 2021-07-26
Payer: COMMERCIAL

## 2021-07-26 DIAGNOSIS — R07.9 CHEST PAIN, UNSPECIFIED TYPE: ICD-10-CM

## 2021-07-26 PROCEDURE — 93016 CV STRESS TEST SUPVJ ONLY: CPT | Performed by: INTERNAL MEDICINE

## 2021-07-26 PROCEDURE — 93017 CV STRESS TEST TRACING ONLY: CPT

## 2021-07-26 PROCEDURE — 93018 CV STRESS TEST I&R ONLY: CPT | Performed by: INTERNAL MEDICINE

## 2021-07-27 LAB
MAX DIASTOLIC BP: 78 MMHG
MAX HEART RATE: 164 BPM
MAX PREDICTED HEART RATE: 170 BPM
MAX. SYSTOLIC BP: 162 MMHG
PROTOCOL NAME: NORMAL
TARGET HR FORMULA: NORMAL
TEST INDICATION: NORMAL
TIME IN EXERCISE PHASE: NORMAL

## 2021-08-02 ENCOUNTER — TELEPHONE (OUTPATIENT)
Dept: FAMILY MEDICINE CLINIC | Facility: OTHER | Age: 51
End: 2021-08-02

## 2021-08-02 NOTE — TELEPHONE ENCOUNTER
----- Message from Alexander Mccord MD sent at 8/2/2021  2:25 AM EDT -----  Please call patient and inform him that cardiac stress did not show any signs of ischemia or irregular rhythms  However, he was noted to have premature beats which is common and can lead to the symptoms he has been experiencing  For now, we will continue to monitor as this is a benign finding  No further work-up or treatment is required  If patient has any questions or concerns I will reach out hopefully in 72 hours      Thanks,  Dr GONZALEZ

## 2021-08-02 NOTE — RESULT ENCOUNTER NOTE
Please call patient and inform him that cardiac stress did not show any signs of ischemia or irregular rhythms  However, he was noted to have premature beats which is common and can lead to the symptoms he has been experiencing  For now, we will continue to monitor as this is a benign finding  No further work-up or treatment is required  If patient has any questions or concerns I will reach out hopefully in 72 hours      Thanks,  Dr GONZALEZ

## 2021-08-24 ENCOUNTER — OFFICE VISIT (OUTPATIENT)
Dept: GASTROENTEROLOGY | Facility: CLINIC | Age: 51
End: 2021-08-24
Payer: COMMERCIAL

## 2021-08-24 VITALS
WEIGHT: 184 LBS | BODY MASS INDEX: 27.25 KG/M2 | HEART RATE: 77 BPM | DIASTOLIC BLOOD PRESSURE: 70 MMHG | SYSTOLIC BLOOD PRESSURE: 122 MMHG | HEIGHT: 69 IN | TEMPERATURE: 97.3 F

## 2021-08-24 DIAGNOSIS — Z11.59 NEED FOR HEPATITIS C SCREENING TEST: ICD-10-CM

## 2021-08-24 DIAGNOSIS — Z12.12 SCREENING FOR COLORECTAL CANCER: Primary | ICD-10-CM

## 2021-08-24 DIAGNOSIS — Z12.11 SCREENING FOR COLORECTAL CANCER: Primary | ICD-10-CM

## 2021-08-24 PROCEDURE — 3074F SYST BP LT 130 MM HG: CPT | Performed by: PHYSICIAN ASSISTANT

## 2021-08-24 PROCEDURE — 3008F BODY MASS INDEX DOCD: CPT | Performed by: PHYSICIAN ASSISTANT

## 2021-08-24 PROCEDURE — 3078F DIAST BP <80 MM HG: CPT | Performed by: PHYSICIAN ASSISTANT

## 2021-08-24 PROCEDURE — 1036F TOBACCO NON-USER: CPT | Performed by: PHYSICIAN ASSISTANT

## 2021-08-24 PROCEDURE — 99243 OFF/OP CNSLTJ NEW/EST LOW 30: CPT | Performed by: PHYSICIAN ASSISTANT

## 2021-08-24 RX ORDER — SODIUM, POTASSIUM,MAG SULFATES 17.5-3.13G
SOLUTION, RECONSTITUTED, ORAL ORAL
Qty: 354 ML | Refills: 0 | Status: SHIPPED | OUTPATIENT
Start: 2021-08-24 | End: 2021-10-14 | Stop reason: ALTCHOICE

## 2021-08-24 NOTE — LETTER
August 24, 2021     Afia Hawkins MD  [address] [facility]    Patient: Haley Devi   YOB: 1970   Date of Visit: 8/24/2021       Dear Dr Keyonna Medrano: Thank you for referring Haley Devi to me for evaluation  Below are my notes for this consultation  If you have questions, please do not hesitate to call me  I look forward to following your patient along with you  Sincerely,        James Babcock PA-C        CC: No Recipients  James Babcock PA-C  8/24/2021 10:09 AM  Sign when Signing Visit  [facility] - Outpatient Consultation  Haley Devi 49 y o  male MRN: [id number]  Encounter: [id number]          ASSESSMENT AND PLAN:      1  Screening for colorectal cancer  He is due for screening colonoscopy  I explained purpose of colonoscopy is to detect and remove colon polyps to prevent cancer  I discussed risks and benefits of the procedure  Risks include but are not limited to infection, bleeding, perforation, missed lesions  He expressed understanding and agrees to proceed  Gave instructions for Suprep  - Colonoscopy; Future  - Na Sulfate-K Sulfate-Mg Sulf (Suprep Bowel Prep Kit) 17 5-3 13-1 6 GM/177ML SOLN; Take as directed by the office  Dispense: 354 mL; Refill: 0    2  Need for hepatitis C screening test  We will check hepatitis C antibody as recommended by the [place]  Preventive Services Task Force  - Hepatitis C antibody; Future    Follow-up as needed  ______________________________________________________________________    HPI:  52-year-old male with hypertension and hyperlipidemia referred by PCP for colorectal cancer screening  He is Kiswahili speaking so [employer]  #[id number] was used to communicate  He has never had a colonoscopy before  He just turned 49 in January  He denies any overt GI bleeding, diarrhea, constipation, abdominal pain, nausea, vomiting   He feels well overall  He denies family history of colon cancer  He denies tobacco use  He drinks alcohol socially  He has never had abdominal surgery  REVIEW OF SYSTEMS:    CONSTITUTIONAL: Denies any fever, chills, rigors, and weight loss  HEENT: No earache or tinnitus  Denies hearing loss or visual disturbances  CARDIOVASCULAR: No chest pain or palpitations  RESPIRATORY: Denies any cough, hemoptysis, shortness of breath or dyspnea on exertion  GASTROINTESTINAL: As noted in the History of Present Illness  GENITOURINARY: No problems with urination  Denies any hematuria or dysuria  NEUROLOGIC: No dizziness or vertigo, denies headaches  MUSCULOSKELETAL: Denies any muscle or joint pain  SKIN: Denies skin rashes or itching  ENDOCRINE: Denies excessive thirst  Denies intolerance to heat or cold  PSYCHOSOCIAL: Denies depression or anxiety  Denies any recent memory loss  Historical Information   Past Medical History:   Diagnosis Date    Hypertension      History reviewed  No pertinent surgical history  Social History   Social History     Substance and Sexual Activity   Alcohol Use Yes    Comment: socially     Social History     Substance and Sexual Activity   Drug Use Never     Social History     Tobacco Use   Smoking Status Never Smoker   Smokeless Tobacco Never Used     Family History   Problem Relation Age of Onset    Hypertension Father     Heart disease Father     Hypertension Sister     Hypertension Brother     Hypertension Sister        Meds/Allergies       Current Outpatient Medications:     lisinopril (ZESTRIL) 10 mg tablet    cyclobenzaprine (FLEXERIL) 10 mg tablet    No Known Allergies        Objective     Blood pressure 122/70, pulse 77, temperature (!) 97 3 °F (36 3 °C), temperature source Tympanic, height 5' 9" (1 753 m), weight 83 5 kg (184 lb)  Body mass index is 27 17 kg/m²          PHYSICAL EXAM:      General Appearance:   Alert, cooperative, no distress   HEENT:   Normocephalic, atraumatic, anicteric      Neck:  Supple, symmetrical, trachea midline   Lungs:   Clear to auscultation bilaterally   Heart[de-identified]   Regular rate and rhythm   Abdomen:   Soft, non-tender, non-distended; normal bowel sounds   Genitalia:   Deferred    Rectal:   Deferred    Extremities:  No cyanosis, clubbing or edema    Pulses:  2+ and symmetric    Skin:  No jaundice, rashes, or lesions    Lymph nodes:  No palpable cervical lymphadenopathy        Lab Results:   No visits with results within 1 Day(s) from this visit  Latest known visit with results is:   Hospital Outpatient Visit on 2021   Component Date Value    Protocol Name 2021 Teodora Hightower Time In Exercise Phase 2021 00:10:00     MAX  SYSTOLIC BP  903     Max Diastolic Bp  78     Max Heart Rate 2021 164     Max Predicted Heart Rate 2021 170     Reason for Termination 2021                      Value:Target Heart Rate Achieved  Maximal exercise (symptom limited)      Test Indication 2021 cp     Target Hr Formular 2021 (220 - Age)*100%          Radiology Results:   Stress test only, exercise    Result Date: 2021  Narrative: [place] [address] [phone] Stress Electrocardiography during Exercise Name: Rodriguez Cullen MR #: [id number] Account #: [de-identified] Study date: 2021 : 1970 Age: 49 years Gender: Male Height: 69 in Weight: 186 lb BSA: 2 mï¾² Allergies: NO KNOWN ALLERGIES Diagnosis: R07 9 - Chest pain, unspecified Primary Physician:  Jennifer Shafer Referring Physician:  Jennifer Shafer Interpreting Physician:  Danilo Walker MD Group:  [facility] Other:  Jamison Morris MS, CCT Technician:  Francisca Garcia CLINICAL QUESTION: Detection of coronary artery disease  CP  SOB  HISTORY: The patient is a 49year old  male   Chest pain status: chest pain  Other symptoms: dyspnea  Coronary artery disease risk factors: dyslipidemia and hypertension  Cardiovascular history: none significant  Medications: an ACE inhibitor/ARB  PHYSICAL EXAM: Baseline physical exam screening: normal  REST ECG: Normal sinus rhythm  Normal baseline ECG  PROCEDURE: Treadmill exercise testing was performed, using the Kathya protocol  Stress electrocardiographic evaluation was performed  KATHYA PROTOCOL: HR bpm SBP mmHg DBP mmHg Symptoms Rhythm/conduct Baseline 86 140 80 none NSR, rare PVC's Stage 1 123 140 88 none sinus tach Stage 2 142 158 90 mild dyspnea same as above Stage 3 151 160 90 moderate dyspnea, moderate fatigue same as above Stage 4 162 -- -- severe dyspnea, severe fatigue same as above Immediate 155 -- -- same as above same as above Recovery 1 144 162 78 subsiding same as above Recovery 2 131 -- -- none sinus tach, rare PVC's Recovery 3 125 138 78 none same as above Recovery 5 114 126 78 none sinus tach SpO2: rest 96, peak 98  STRESS SUMMARY: Duration of exercise was 10 min  The patient exercised to protocol stage 4  Maximal work rate was 13 4 METs  Maximal heart rate during stress was 162 bpm ( 95 % of maximal predicted heart rate)  The rate-pressure product for the peak heart rate and blood pressure was 36405  The stress test was terminated due to achievement of maximal (symptom limited) exercise and achievement of target heart rate  The stress ECG was normal  Arrhythmia during stress: isolated premature ventricular beats  SUMMARY: -  Clinical question: Detection of coronary artery disease  CP  SOB  -  Rest ECG: Normal sinus rhythm  Normal baseline ECG  -  Stress results: Duration of exercise was 10 min  The patient exercised to protocol stage 4  Maximal work rate was 13 4 METs  Maximal heart rate during stress was 162 bpm ( 95 % of maximal predicted heart rate)  Target heart rate was achieved  Maximal systolic blood pressure during stress was 162 mmHg   -  ECG conclusions: The stress ECG was normal  Arrhythmia during stress: isolated premature ventricular beats   Prepared and signed by Marleni Palacio MD Signed 07/26/2021 09:51:33     Stress strip    Result Date: 7/27/2021  Narrative: Confirmed by Tessa Conway ([id number]),  Tomas Saldivar ([id number]) on 7/27/2021 3:05:27 PM

## 2021-08-24 NOTE — PATIENT INSTRUCTIONS
Colonoscopy scheduled 10/14/21 @[facility] with Dr Courtney Monreal instructions given to pt by Timothy Snell in the office

## 2021-08-24 NOTE — PROGRESS NOTES
[facility] - Outpatient Consultation  Aissatou Marsh 49 y o  male MRN: [id number]  Encounter: [id number]          ASSESSMENT AND PLAN:      1  Screening for colorectal cancer  He is due for screening colonoscopy  I explained purpose of colonoscopy is to detect and remove colon polyps to prevent cancer  I discussed risks and benefits of the procedure  Risks include but are not limited to infection, bleeding, perforation, missed lesions  He expressed understanding and agrees to proceed  Gave instructions for Suprep  - Colonoscopy; Future  - Na Sulfate-K Sulfate-Mg Sulf (Suprep Bowel Prep Kit) 17 5-3 13-1 6 GM/177ML SOLN; Take as directed by the office  Dispense: 354 mL; Refill: 0    2  Need for hepatitis C screening test  We will check hepatitis C antibody as recommended by the [place]  Preventive Services Task Force  - Hepatitis C antibody; Future    Follow-up as needed  ______________________________________________________________________    HPI:  45-year-old male with hypertension and hyperlipidemia referred by PCP for colorectal cancer screening  He is Frisian speaking so [employer]  #[id number] was used to communicate  He has never had a colonoscopy before  He just turned 49 in January  He denies any overt GI bleeding, diarrhea, constipation, abdominal pain, nausea, vomiting  He feels well overall  He denies family history of colon cancer  He denies tobacco use  He drinks alcohol socially  He has never had abdominal surgery  REVIEW OF SYSTEMS:    CONSTITUTIONAL: Denies any fever, chills, rigors, and weight loss  HEENT: No earache or tinnitus  Denies hearing loss or visual disturbances  CARDIOVASCULAR: No chest pain or palpitations  RESPIRATORY: Denies any cough, hemoptysis, shortness of breath or dyspnea on exertion  GASTROINTESTINAL: As noted in the History of Present Illness  GENITOURINARY: No problems with urination   Denies any hematuria or dysuria  NEUROLOGIC: No dizziness or vertigo, denies headaches  MUSCULOSKELETAL: Denies any muscle or joint pain  SKIN: Denies skin rashes or itching  ENDOCRINE: Denies excessive thirst  Denies intolerance to heat or cold  PSYCHOSOCIAL: Denies depression or anxiety  Denies any recent memory loss  Historical Information   Past Medical History:   Diagnosis Date    Hypertension      History reviewed  No pertinent surgical history  Social History   Social History     Substance and Sexual Activity   Alcohol Use Yes    Comment: socially     Social History     Substance and Sexual Activity   Drug Use Never     Social History     Tobacco Use   Smoking Status Never Smoker   Smokeless Tobacco Never Used     Family History   Problem Relation Age of Onset    Hypertension Father     Heart disease Father     Hypertension Sister     Hypertension Brother     Hypertension Sister        Meds/Allergies       Current Outpatient Medications:     lisinopril (ZESTRIL) 10 mg tablet    cyclobenzaprine (FLEXERIL) 10 mg tablet    No Known Allergies        Objective     Blood pressure 122/70, pulse 77, temperature (!) 97 3 °F (36 3 °C), temperature source Tympanic, height 5' 9" (1 753 m), weight 83 5 kg (184 lb)  Body mass index is 27 17 kg/m²  PHYSICAL EXAM:      General Appearance:   Alert, cooperative, no distress   HEENT:   Normocephalic, atraumatic, anicteric      Neck:  Supple, symmetrical, trachea midline   Lungs:   Clear to auscultation bilaterally   Heart[de-identified]   Regular rate and rhythm   Abdomen:   Soft, non-tender, non-distended; normal bowel sounds   Genitalia:   Deferred    Rectal:   Deferred    Extremities:  No cyanosis, clubbing or edema    Pulses:  2+ and symmetric    Skin:  No jaundice, rashes, or lesions    Lymph nodes:  No palpable cervical lymphadenopathy        Lab Results:   No visits with results within 1 Day(s) from this visit     Latest known visit with results is:   [facility] Outpatient Visit on 2021   Component Date Value    Protocol Name 2021 Chio Nugent Time In Exercise Phase 2021 00:10:00     MAX  SYSTOLIC BP  508     Max Diastolic Bp  78     Max Heart Rate 2021 164     Max Predicted Heart Rate 2021 170     Reason for Termination 2021                      Value:Target Heart Rate Achieved  Maximal exercise (symptom limited)      Test Indication 2021 cp     Target Hr Formular 2021 (220 - Age)*100%          Radiology Results:   Stress test only, exercise    Result Date: 2021  Narrative: [place] [address] 300 [facility], [address] [place] [address] [phone] Stress Electrocardiography during Exercise Name: Glen Cabrera MR #: [id number] Account #: [de-identified] Study date: 2021 : 1970 Age: 49 years Gender: Male Height: 69 in Weight: 186 lb BSA: 2 mï¾² Allergies: NO KNOWN ALLERGIES Diagnosis: R07 9 - Chest pain, unspecified Primary Physician:  Laura Negron Referring Physician:  Laura Negron Interpreting Physician:  Marleni Palacio MD Group:  [facility] Other:  Devon Menjivar MS, CCT Technician:  Jack Armendariz CLINICAL QUESTION: Detection of coronary artery disease  CP  SOB  HISTORY: The patient is a 49year old  male  Chest pain status: chest pain  Other symptoms: dyspnea  Coronary artery disease risk factors: dyslipidemia and hypertension  Cardiovascular history: none significant  Medications: an ACE inhibitor/ARB  PHYSICAL EXAM: Baseline physical exam screening: normal  REST ECG: Normal sinus rhythm  Normal baseline ECG  PROCEDURE: Treadmill exercise testing was performed, using the Kodi protocol  Stress electrocardiographic evaluation was performed   KODI PROTOCOL: HR bpm SBP mmHg DBP mmHg Symptoms Rhythm/conduct Baseline 86 140 80 none NSR, rare PVC's Stage 1 123 140 88 none sinus tach Stage 2 142 158 90 mild dyspnea same as above Stage 3 151 160 90 moderate dyspnea, moderate fatigue same as above Stage 4 162 -- -- severe dyspnea, severe fatigue same as above Immediate 155 -- -- same as above same as above Recovery 1 144 162 78 subsiding same as above Recovery 2 131 -- -- none sinus tach, rare PVC's Recovery 3 125 138 78 none same as above Recovery 5 114 126 78 none sinus tach SpO2: rest 96, peak 98  STRESS SUMMARY: Duration of exercise was 10 min  The patient exercised to protocol stage 4  Maximal work rate was 13 4 METs  Maximal heart rate during stress was 162 bpm ( 95 % of maximal predicted heart rate)  The rate-pressure product for the peak heart rate and blood pressure was 73216  The stress test was terminated due to achievement of maximal (symptom limited) exercise and achievement of target heart rate  The stress ECG was normal  Arrhythmia during stress: isolated premature ventricular beats  SUMMARY: -  Clinical question: Detection of coronary artery disease  CP  SOB  -  Rest ECG: Normal sinus rhythm  Normal baseline ECG  -  Stress results: Duration of exercise was 10 min  The patient exercised to protocol stage 4  Maximal work rate was 13 4 METs  Maximal heart rate during stress was 162 bpm ( 95 % of maximal predicted heart rate)  Target heart rate was achieved  Maximal systolic blood pressure during stress was 162 mmHg  -  ECG conclusions: The stress ECG was normal  Arrhythmia during stress: isolated premature ventricular beats   Prepared and signed by Luiz Stratton MD Signed 07/26/2021 09:51:33     Stress strip    Result Date: 7/27/2021  Narrative: Confirmed by Estrella Foley ([id number]),  Liliya Chiu ([id number]) on 7/27/2021 3:05:27 PM

## 2021-08-30 DIAGNOSIS — I10 ESSENTIAL HYPERTENSION: ICD-10-CM

## 2021-08-30 RX ORDER — LISINOPRIL 10 MG/1
10 TABLET ORAL DAILY
Qty: 90 TABLET | Refills: 0 | Status: SHIPPED | OUTPATIENT
Start: 2021-08-30 | End: 2021-10-28 | Stop reason: SDUPTHER

## 2021-09-25 ENCOUNTER — APPOINTMENT (OUTPATIENT)
Dept: LAB | Facility: CLINIC | Age: 51
End: 2021-09-25
Payer: COMMERCIAL

## 2021-09-25 DIAGNOSIS — Z11.59 NEED FOR HEPATITIS C SCREENING TEST: ICD-10-CM

## 2021-09-25 LAB — HCV AB SER QL: NORMAL

## 2021-09-25 PROCEDURE — 86803 HEPATITIS C AB TEST: CPT

## 2021-09-25 PROCEDURE — 36415 COLL VENOUS BLD VENIPUNCTURE: CPT

## 2021-09-30 ENCOUNTER — ANESTHESIA (OUTPATIENT)
Dept: ANESTHESIOLOGY | Facility: HOSPITAL | Age: 51
End: 2021-09-30

## 2021-09-30 ENCOUNTER — TELEPHONE (OUTPATIENT)
Dept: GASTROENTEROLOGY | Facility: CLINIC | Age: 51
End: 2021-09-30

## 2021-09-30 ENCOUNTER — ANESTHESIA EVENT (OUTPATIENT)
Dept: ANESTHESIOLOGY | Facility: HOSPITAL | Age: 51
End: 2021-09-30

## 2021-09-30 NOTE — TELEPHONE ENCOUNTER
----- Message from Katerina Calzada PA-C sent at 9/26/2021 11:17 AM EDT -----  Patient is Taiwanese-speaking  Please let him know he is negative for hepatitis-C infection, good news

## 2021-10-13 ENCOUNTER — TELEPHONE (OUTPATIENT)
Dept: GASTROENTEROLOGY | Facility: AMBULARY SURGERY CENTER | Age: 51
End: 2021-10-13

## 2021-10-14 ENCOUNTER — ANESTHESIA EVENT (OUTPATIENT)
Dept: GASTROENTEROLOGY | Facility: AMBULARY SURGERY CENTER | Age: 51
End: 2021-10-14

## 2021-10-14 ENCOUNTER — ANESTHESIA (OUTPATIENT)
Dept: GASTROENTEROLOGY | Facility: AMBULARY SURGERY CENTER | Age: 51
End: 2021-10-14

## 2021-10-14 ENCOUNTER — HOSPITAL ENCOUNTER (OUTPATIENT)
Dept: GASTROENTEROLOGY | Facility: AMBULARY SURGERY CENTER | Age: 51
Setting detail: OUTPATIENT SURGERY
Discharge: HOME | End: 2021-10-14
Admitting: PHYSICIAN ASSISTANT
Payer: COMMERCIAL

## 2021-10-14 VITALS
WEIGHT: 181 LBS | DIASTOLIC BLOOD PRESSURE: 98 MMHG | RESPIRATION RATE: 16 BRPM | SYSTOLIC BLOOD PRESSURE: 146 MMHG | HEIGHT: 69 IN | TEMPERATURE: 98 F | HEART RATE: 81 BPM | OXYGEN SATURATION: 97 % | BODY MASS INDEX: 26.81 KG/M2

## 2021-10-14 DIAGNOSIS — Z12.12 SCREENING FOR COLORECTAL CANCER: ICD-10-CM

## 2021-10-14 DIAGNOSIS — Z12.11 SCREENING FOR COLORECTAL CANCER: ICD-10-CM

## 2021-10-14 PROCEDURE — G0121 COLON CA SCRN NOT HI RSK IND: HCPCS | Performed by: INTERNAL MEDICINE

## 2021-10-14 RX ORDER — LIDOCAINE HYDROCHLORIDE 10 MG/ML
0.5 INJECTION, SOLUTION EPIDURAL; INFILTRATION; INTRACAUDAL; PERINEURAL ONCE AS NEEDED
Status: DISCONTINUED | OUTPATIENT
Start: 2021-10-14 | End: 2021-10-18 | Stop reason: HOSPADM

## 2021-10-14 RX ORDER — METOCLOPRAMIDE HYDROCHLORIDE 5 MG/ML
10 INJECTION INTRAMUSCULAR; INTRAVENOUS ONCE AS NEEDED
Status: CANCELLED | OUTPATIENT
Start: 2021-10-14

## 2021-10-14 RX ORDER — SIMETHICONE 20 MG/.3ML
EMULSION ORAL CODE/TRAUMA/SEDATION MEDICATION
Status: COMPLETED | OUTPATIENT
Start: 2021-10-14 | End: 2021-10-14

## 2021-10-14 RX ORDER — ONDANSETRON 2 MG/ML
4 INJECTION INTRAMUSCULAR; INTRAVENOUS ONCE AS NEEDED
Status: CANCELLED | OUTPATIENT
Start: 2021-10-14

## 2021-10-14 RX ORDER — LIDOCAINE HYDROCHLORIDE 10 MG/ML
0.5 INJECTION, SOLUTION EPIDURAL; INFILTRATION; INTRACAUDAL; PERINEURAL ONCE AS NEEDED
Status: CANCELLED | OUTPATIENT
Start: 2021-10-14

## 2021-10-14 RX ORDER — PROPOFOL 10 MG/ML
INJECTION, EMULSION INTRAVENOUS AS NEEDED
Status: DISCONTINUED | OUTPATIENT
Start: 2021-10-14 | End: 2021-10-14

## 2021-10-14 RX ORDER — DIPHENHYDRAMINE HYDROCHLORIDE 50 MG/ML
12.5 INJECTION INTRAMUSCULAR; INTRAVENOUS ONCE AS NEEDED
Status: CANCELLED | OUTPATIENT
Start: 2021-10-14

## 2021-10-14 RX ORDER — SODIUM CHLORIDE, SODIUM LACTATE, POTASSIUM CHLORIDE, CALCIUM CHLORIDE 600; 310; 30; 20 MG/100ML; MG/100ML; MG/100ML; MG/100ML
125 INJECTION, SOLUTION INTRAVENOUS CONTINUOUS
Status: DISCONTINUED | OUTPATIENT
Start: 2021-10-14 | End: 2021-10-18 | Stop reason: HOSPADM

## 2021-10-14 RX ORDER — PROPOFOL 10 MG/ML
INJECTION, EMULSION INTRAVENOUS CONTINUOUS PRN
Status: DISCONTINUED | OUTPATIENT
Start: 2021-10-14 | End: 2021-10-14

## 2021-10-14 RX ORDER — SODIUM CHLORIDE, SODIUM LACTATE, POTASSIUM CHLORIDE, CALCIUM CHLORIDE 600; 310; 30; 20 MG/100ML; MG/100ML; MG/100ML; MG/100ML
125 INJECTION, SOLUTION INTRAVENOUS CONTINUOUS
Status: CANCELLED | OUTPATIENT
Start: 2021-10-14

## 2021-10-14 RX ADMIN — PROPOFOL 110 MCG/KG/MIN: 10 INJECTION, EMULSION INTRAVENOUS at 13:39

## 2021-10-14 RX ADMIN — PROPOFOL 100 MG: 10 INJECTION, EMULSION INTRAVENOUS at 13:39

## 2021-10-14 RX ADMIN — Medication 40 MG: at 13:42

## 2021-10-28 DIAGNOSIS — I10 ESSENTIAL HYPERTENSION: ICD-10-CM

## 2021-10-28 RX ORDER — LISINOPRIL 10 MG/1
10 TABLET ORAL DAILY
Qty: 90 TABLET | Refills: 0 | Status: SHIPPED | OUTPATIENT
Start: 2021-10-28 | End: 2022-01-12 | Stop reason: SDUPTHER

## 2022-01-12 DIAGNOSIS — I10 ESSENTIAL HYPERTENSION: ICD-10-CM

## 2022-01-13 RX ORDER — LISINOPRIL 10 MG/1
10 TABLET ORAL DAILY
Qty: 90 TABLET | Refills: 0 | Status: SHIPPED | OUTPATIENT
Start: 2022-01-13 | End: 2022-02-07 | Stop reason: SDUPTHER

## 2022-02-07 ENCOUNTER — OFFICE VISIT (OUTPATIENT)
Dept: FAMILY MEDICINE CLINIC | Facility: OTHER | Age: 52
End: 2022-02-07
Payer: COMMERCIAL

## 2022-02-07 VITALS
SYSTOLIC BLOOD PRESSURE: 132 MMHG | OXYGEN SATURATION: 98 % | RESPIRATION RATE: 18 BRPM | DIASTOLIC BLOOD PRESSURE: 84 MMHG | WEIGHT: 194.8 LBS | HEIGHT: 69 IN | HEART RATE: 82 BPM | TEMPERATURE: 98 F | BODY MASS INDEX: 28.85 KG/M2

## 2022-02-07 DIAGNOSIS — I10 ESSENTIAL HYPERTENSION: ICD-10-CM

## 2022-02-07 DIAGNOSIS — Z13.29 SCREENING FOR THYROID DISORDER: ICD-10-CM

## 2022-02-07 DIAGNOSIS — Z00.00 ANNUAL PHYSICAL EXAM: Primary | ICD-10-CM

## 2022-02-07 DIAGNOSIS — Z23 ENCOUNTER FOR IMMUNIZATION: ICD-10-CM

## 2022-02-07 PROCEDURE — 3008F BODY MASS INDEX DOCD: CPT | Performed by: FAMILY MEDICINE

## 2022-02-07 PROCEDURE — 90750 HZV VACC RECOMBINANT IM: CPT

## 2022-02-07 PROCEDURE — 99396 PREV VISIT EST AGE 40-64: CPT | Performed by: FAMILY MEDICINE

## 2022-02-07 PROCEDURE — 1036F TOBACCO NON-USER: CPT | Performed by: FAMILY MEDICINE

## 2022-02-07 PROCEDURE — 90471 IMMUNIZATION ADMIN: CPT

## 2022-02-07 PROCEDURE — 3725F SCREEN DEPRESSION PERFORMED: CPT | Performed by: FAMILY MEDICINE

## 2022-02-07 RX ORDER — LISINOPRIL 10 MG/1
10 TABLET ORAL DAILY
Qty: 90 TABLET | Refills: 1 | Status: SHIPPED | OUTPATIENT
Start: 2022-02-07 | End: 2022-05-24 | Stop reason: SDUPTHER

## 2022-02-07 NOTE — PROGRESS NOTES
[facility]    NAME: Crista Howell  AGE: 47 y o  SEX: male  : 1970     DATE: 2022     Assessment and Plan:     Problem List Items Addressed This Visit        Cardiovascular and Mediastinum    Essential hypertension    Relevant Medications    lisinopril (ZESTRIL) 10 mg tablet    Other Relevant Orders    Comprehensive metabolic panel    Lipid panel    Hemoglobin A1C      Other Visit Diagnoses     Annual physical exam    -  Primary  - Patient UTD with colorectal cancer screening completed 10/2021, next due in 10 years    Relevant Orders    CBC and differential    Screening for thyroid disorder        Relevant Orders    T4, free    TSH, 3rd generation    BMI 28 0-28 9,adult        Encounter for immunization        Relevant Orders    Zoster Vaccine Recombinant IM (Completed)          Immunizations and preventive care screenings were discussed with patient today  Appropriate education was printed on patient's after visit summary  Counseling:  Alcohol/drug use: discussed moderation in alcohol intake, the recommendations for healthy alcohol use, and avoidance of illicit drug use  Dental Health: discussed importance of regular tooth brushing, flossing, and dental visits  Injury prevention: discussed safety/seat belts, safety helmets, smoke detectors, carbon dioxide detectors, and smoking near bedding or upholstery  Sexual health: discussed sexually transmitted diseases, partner selection, use of condoms, avoidance of unintended pregnancy, and contraceptive alternatives  · Exercise: the importance of regular exercise/physical activity was discussed  Recommend exercise 3-5 times per week for at least 30 minutes  BMI Counseling: Body mass index is 28 77 kg/m²  The BMI is above normal  Nutrition recommendations include encouraging healthy choices of fruits and vegetables and moderation in carbohydrate intake  Exercise recommendations include moderate physical activity 150 minutes/week and exercising 3-5 times per week  Rationale for BMI follow-up plan is due to patient being overweight or obese  Depression Screening and Follow-up Plan: Patient was screened for depression during today's encounter  They screened negative with a PHQ-2 score of 0  Nutrition Assessment and Intervention:     Ordered nutritional assessment labs      Physical Activity Assessment and Intervention:    Activity journal reviewed      Other interventions: Patient to start exercising at least 10 mins each day (walking), will slowly increase goal as tolerated     Emotional and Mental Well-being, Sleep, Connectedness Assessment and Intervention:    PHQ-9 or GAD7 performed for initial evaluation or follow-up      Tobacco and Toxic Substance Assessment and Intervention:     Tobacco use screening performed    Alcohol and drug use screening performed      Therapeutic Lifestyle Change Visit:     One-on-one comprehensive counseling, coaching, and health behavior change visit completed        Return in about 6 months (around 8/7/2022) for HTN recheck  Chief Complaint:     Chief Complaint   Patient presents with    Physical Exam      History of Present Illness:     Adult Annual Physical   Patient here for a comprehensive physical exam  The patient reports problems - patient reporting worsening seasonal allergies  Discussed that he may use OTC medications inlcuding Claritin/Allegra/Flonase for symptom relief as needed       Diet and Physical Activity  · Diet/Nutrition: consuming 3-5 servings of fruits/vegetables daily and High carb diet  · Exercise: no formal exercise        Depression Screening  PHQ-2/9 Depression Screening    Little interest or pleasure in doing things: 0 - not at all  Feeling down, depressed, or hopeless: 0 - not at all  PHQ-2 Score: 0  PHQ-2 Interpretation: Negative depression screen       General Health  · Sleep: sleeps well and gets 7-8 hours of sleep on average  · Hearing: normal - bilateral   · Vision: no vision problems  · Dental: brushes teeth once daily   Health  · Symptoms include: none     Review of Systems:     Review of Systems   Constitutional: Negative for fatigue and unexpected weight change  HENT: Negative for hearing loss  Eyes: Negative for visual disturbance  Respiratory: Negative for chest tightness and shortness of breath  Cardiovascular: Negative for chest pain and palpitations  Gastrointestinal: Negative for blood in stool, nausea and vomiting  Endocrine: Negative for polydipsia, polyphagia and polyuria  Genitourinary: Negative for decreased urine volume and difficulty urinating  Allergic/Immunologic: Positive for environmental allergies  Neurological: Negative for dizziness and headaches        Past Medical History:     Past Medical History:   Diagnosis Date    Hypertension       Past Surgical History:     Past Surgical History:   Procedure Laterality Date    NO PAST SURGERIES        Family History:     Family History   Problem Relation Age of Onset    Hypertension Father     Heart disease Father     Hypertension Sister     Hypertension Brother     Hypertension Sister       Social History:     Social History     Socioeconomic History    Marital status: /Civil Union     Spouse name: None    Number of children: None    Years of education: None    Highest education level: None   Occupational History    None   Tobacco Use    Smoking status: Never Smoker    Smokeless tobacco: Never Used   Vaping Use    Vaping Use: Never used   Substance and Sexual Activity    Alcohol use: Yes     Comment: weekends    Drug use: Never    Sexual activity: None   Other Topics Concern    None   Social History Narrative    · Most recent tobacco use screenin2019      · Do you currently or have you served in the [employer]:   No      · Were you activated, into active duty, as a member of the [employer] or as a Reservist:   No      Social Determinants of Health     Financial Resource Strain: Not on file   Food Insecurity: Not on file   Transportation Needs: Not on file   Physical Activity: Not on file   Stress: Not on file   Social Connections: Not on file   Intimate Partner Violence: Not on file   Housing Stability: Not on file      Current Medications:     Current Outpatient Medications   Medication Sig Dispense Refill    lisinopril (ZESTRIL) 10 mg tablet Take 1 tablet (10 mg total) by mouth daily 90 tablet 1     No current facility-administered medications for this visit  Allergies:     No Known Allergies   Physical Exam:     /84   Pulse 82   Temp 98 °F (36 7 °C)   Resp 18   Ht 5' 9" (1 753 m)   Wt 88 4 kg (194 lb 12 8 oz)   SpO2 98%   BMI 28 77 kg/m²     Physical Exam  Vitals reviewed  Constitutional:       Appearance: Normal appearance  HENT:      Head: Normocephalic and atraumatic  Right Ear: Tympanic membrane, ear canal and external ear normal       Left Ear: Tympanic membrane, ear canal and external ear normal       Nose: Nose normal       Comments: Mild erythema of right nasal passage     Mouth/Throat:      Mouth: Mucous membranes are moist       Pharynx: Oropharynx is clear  Eyes:      Conjunctiva/sclera: Conjunctivae normal    Cardiovascular:      Rate and Rhythm: Normal rate and regular rhythm  Pulses: Normal pulses  Pulmonary:      Effort: Pulmonary effort is normal       Breath sounds: Normal breath sounds  Abdominal:      General: Bowel sounds are normal       Palpations: Abdomen is soft  Tenderness: There is no abdominal tenderness  Musculoskeletal:         General: Normal range of motion  Cervical back: Neck supple  Right lower leg: No edema  Left lower leg: No edema  Skin:     General: Skin is warm and dry  Neurological:      Mental Status: He is alert and oriented to person, place, and time  Psychiatric:         Mood and Affect: Mood normal           Jesus Alberto Morrison MD  [place] [id number]

## 2022-02-07 NOTE — PATIENT INSTRUCTIONS
Alergias   LO QUE NECESITA SABER:   Silvestre Nagel son [facility] reacción del sistema inmunológico a james sustancia llamada alérgeno  El sistema inmunológico ve el alérgeno brandy james amenaza y lo ataca  Valentino [place] reacción alérgica puede ser leve o de peligro mortal  Val[place]ino [place] reacción de peligro mortal se conoce brandy anafilaxia  La anafilaxia es james reacción repentina y de peligro mortal que requiere de tratamiento inmediato  INSTRUCCIONES SOBRE EL [facility]ITAL[facility]:   Llame al 911 en erum de presentar signos o síntomas de anafilaxia, brandy dificultad para respirar, inflamación en bella boca o garganta, o sibilancias  También es posible que tenga comezón, sarpullido, urticaria o sienta que se va a desmayar  Regrese a la zhanna de emergencias si:  · Tiene la sensación de hormigueo en las danyell o pies  · Bella piel está enrojecida o Violeta Morning  Comuníquese con bella médico si:  · Usted tiene preguntas o inquietudes acerca de bella condición o cuidado  Medicamentos:  · Los antihistamínicos sirven para reducir el comezón, estornudo e inflamación  Usted los puede talia en forma de píldora o de gotas en ginger ojos o nariz  · Los descongestionantes ayudan a que bella nariz se sienta menos congestionada  · Los tratamientos de uso tópico ayudan a reducir el comezón o inflamación  Usted también podría recibir [employer] o gotas para los ojos  · La epinefrina se [place] para tratar [facility] reacción alérgica grave brandy la anafilaxis  · [employer] ginger medicamentos brandy se le haya indicado  Consulte con bella médico si usted isael que bella medicamento no le está ayudando o si presenta efectos secundarios  Infórmele si es alérgico a algún medicamento  Mantenga james lista actualizada de los [place], las vitaminas y los productos herbales que carolina  Incluya los siguientes datos de los medicamentos: cantidad, frecuencia y motivo de administración  Traiga con usted la lista o los envases de las píldoras a ginger citas de seguimiento   Lleve la [facility] medicamentos con usted en erum de james emergencia  Pautas que necesito seguir para los signos o síntomas de la anafilaxia:  · Inmediatamente aplíquese 1 inyección de epinefrina en hágalo solamente en el músculo del muslo que da hacia afuera  · Deje la inyección en el lugar según las indicaciones  Es probable que bella médico le recomiende que se la deje puesta por hasta 10 segundos antes de quitarla  Sophia ayuda a asegurarse de que toda la epinefrina sea aplicada  · Llame al 911 y vaya al servicio de [place], aunque la inyección haya madhu ginger síntomas  No maneje usted mismo  Lleve con usted la inyección de epinefrina que usó  Precauciones de seguridad a talia si usted corre riesgo de anafilaxia:  · Tenga a mano 2 inyecciones de epinefrina en todo momento  Puede que usted necesite james segunda inyección ya que la epinefrina solamente actúa por aproximadamente 20 minutos y los síntomas podrían regresar  Bella médico puede mostrarle a usted y a ginger familiares cómo aplicar la inyección  Revise la fecha de vencimiento todos los meses y reemplace el medicamento de bella vencimiento  · [place] un plan de acción  Bella médico puede ayudarle a crear un plan escrito que explique la alergia y un plan de emergencia para tratar james reacción  El plan explica cuándo aplicar james segunda inyección de epinefrina si los síntomas vuelven o no mejoran después de la primera inyección  Asegúrese de que ginger familiares y personal de bella trabajo tengan copias del plan de acción y las instrucciones de emergencia  Muéstreles cómo aplicar la inyección de epinefrina  · Tenga cuidado cuando jacki ejercicio  Si usted tuvo anafilaxis inducida por el ejercicio, no se ejercite inmediatamente después de comer  Pare de ejercitarse de inmediato si empieza a desarrollar cualquier signo o síntoma de anafilaxia  Puede que al principio se sienta cansado, caliente o tenga comezón en la piel   También podría desarrollar urticaria, inflamación y problemas graves de respiración si continúa ejercitándose  · Lleve james identificación de [employer]  Use un brazalete o collar de alerta médica o lleve james tarjeta que explique la alergia  Pregúntele a bella médico dónde conseguir estos artículos  · Informe a todos los [place]  Estos incluyen a los odontólogos, enfermeras, médicos y cirujanos  Controle las alergias:  · Use enjuagues nasales según las indicaciones  Hágase enjuagues con james solución salina diariamente  Cayey ayudará a [facility] alérgenos de bella nariz  Use agua destilada, si es posible  También puede hervir agua del grifo y dejar que se enfríe antes de usarla  No utilice agua del grifo que no haya sido hervida  · No fume  Los síntomas de la alergia disminuyen si no está alrededor del humo  La nicotina y otras sustancias químicas que contienen los cigarrillos y cigarros pueden dañar los pulmones  Pida información a bella médico si usted actualmente fuma y necesita ayuda para dejar de fumar  Los cigarrillos electrónicos o el tabaco sin humo igualmente contienen nicotina  Consulte con bella médico antes de [employer]  Prevenga james reacción alérgica:  · No salga afuera cuando el recuento de polen esté muy alto en erum de sufrir de alergias estacionales  Jami síntomas podrían mejorar si usted sale afuera solo por la mañana o la noche  Use el aire acondicionado y [employer] filtros de aire a [place]  · Evite el polvo, pelaje y moho  Limpie el polvo y aspire bella hogar a menudo  Es posible que usted necesite usar james máscara al [place]  Mantenga a las mascotas en ciertas habitaciones y báñelos frecuentemente  Use un deshumidificador (máquina que reduce la humedad) para ayudar a evitar el moho  · No use productos que contengan látex en erum de tener alergia al látex  Si usted trabaja en la nirav de la kisha o preparando alimentos, utilice guantes sin látex   Siempre informe a los médicos si usted tiene [place] al látex  · Evite áreas o actividades que atraen a los insectos en erum que usted tenga james alergia a las picaduras de insectos  Las e|tab botes de Johnny Mata y keyon de [facility]  No use ropa de colores brillantes ni perfumes roula al estar afuera  · Evite james reacción alérgica causada por los alimentos  Puede tener james reacción si bella comida no se prepara de un modo seguro  Por ejemplo, podrían servirle comida que haya estado en contacto con el alimento desencadenante tucker la preparación  [place] se conoce brandy contaminación cruzada  Las herramientas de la cocina también pueden causar contaminación cruzada  También puede comer productos horneados que contienen un alimento desencadenante que usted desconoce  Pregunte si el producto contiene el alimento desencadenante antes de manipularlo o comerlo  Acuda a ginger consultas de control con bella médico según le indicaron  Anote ginger preguntas para que se acuerde de hacerlas tucker ginger visitas  Cuando tenga james reacción alérgica, anote todo a lo que estuvo expuesto en las últimas 2 horas antes de la reacción  Lleve esta información a bella próxima michael  © Copyright [employer] 2021 Information is for End User's use only and may not be sold, redistributed or otherwise used for commercial purposes  All illustrations and images included in CareNotes® are the copyrighted property of [employer]  or [address] [place] [address] es sólo para uso en educación  Bella intención no es darle un consejo médico sobre enfermedades o tratamientos  Colsulte con bella [place] farmacéutico antes de seguir cualquier régimen médico para saber si es seguro y efectivo para usted  Flonase/ Nasocort    Wellness Visit for Adults   AMBULATORY CARE:   A wellness visit  is when you see your healthcare provider to get screened for health problems  Your healthcare provider will also give you advice on how to stay healthy   Write down your questions so you remember to ask them  Ask your healthcare provider how often you should have a wellness visit  What happens at a wellness visit:  Your healthcare provider will ask about your health, and your family history of health problems  This includes high blood pressure, heart disease, and cancer  He or she will ask if you have symptoms that concern you, if you smoke, and about your mood  You may also be asked about your intake of medicines, supplements, food, and alcohol  Any of the following may be done:  · Your weight  will be checked  Your height may also be checked so your body mass index (BMI) can be calculated  Your BMI shows if you are at a healthy weight  · Your blood pressure  and heart rate will be checked  Your temperature may also be checked  · Blood and urine tests  may be done  Blood tests may be done to check your cholesterol levels  Abnormal cholesterol levels increase your risk for heart disease and stroke  You may also need a blood or urine test to check for diabetes if you are at increased risk  Urine tests may be done to look for signs of an infection or kidney disease  · A physical exam  includes checking your heartbeat and lungs with a stethoscope  Your healthcare provider may also check your skin to look for sun damage  · Screening tests  may be recommended  A screening test is done to check for diseases that may not cause symptoms  The screening tests you may need depend on your age, gender, family history, and lifestyle habits  For example, colorectal screening may be recommended if you are 48years old or older  Screening tests you need if you are a woman:   · A Pap smear  is used to screen for cervical cancer  Pap smears are usually done every 3 to 5 years depending on your age  You may need them more often if you have had abnormal Pap smear test results in the past  Ask your healthcare provider how often you should have a Pap smear      · A mammogram  is an x-ray of your breasts to screen for breast cancer  Experts recommend mammograms every 2 years starting at age 48 years  You may need a mammogram at age 52 years or younger if you have an increased risk for breast cancer  Talk to your healthcare provider about when you should start having mammograms and how often you need them  Vaccines you may need:   · Get an influenza vaccine  every year  The influenza vaccine protects you from the flu  Several types of viruses cause the flu  The viruses change over time, so new vaccines are made each year  · Get a tetanus-diphtheria (Td) booster vaccine  every 10 years  This vaccine protects you against tetanus and diphtheria  Tetanus is a severe infection that may cause painful muscle spasms and lockjaw  Diphtheria is a severe bacterial infection that causes a thick covering in the back of your mouth and throat  · Get a human papillomavirus (HPV) vaccine  if you are female and aged 23 to 32 or male 23 to 24 and never received it  This vaccine protects you from HPV infection  HPV is the most common infection spread by sexual contact  HPV may also cause vaginal, penile, and anal cancers  · Get a pneumococcal vaccine  if you are aged 72 years or older  The pneumococcal vaccine is an injection given to protect you from pneumococcal disease  Pneumococcal disease is an infection caused by pneumococcal bacteria  The infection may cause pneumonia, meningitis, or an ear infection  · Get a shingles vaccine  if you are 60 or older, even if you have had shingles before  The shingles vaccine is an injection to protect you from the varicella-zoster virus  This is the same virus that causes chickenpox  Shingles is a painful rash that develops in people who had chickenpox or have been exposed to the virus  How to eat healthy:  My Plate is a model for planning healthy meals  It shows the types and amounts of foods that should go on your plate   Fruits and vegetables make up about half of your plate, and grains and protein make up the other half  A serving of dairy is included on the side of your plate  The amount of calories and serving sizes you need depends on your age, gender, weight, and height  Examples of healthy foods are listed below:  · Eat a variety of vegetables  such as dark green, red, and orange vegetables  You can also include canned vegetables low in sodium (salt) and frozen vegetables without added butter or sauces  · Eat a variety of fresh fruits , canned fruit in 100% juice, frozen fruit, and dried fruit  · Include whole grains  At least half of the grains you eat should be whole grains  Examples include whole-wheat bread, wheat pasta, brown rice, and whole-grain cereals such as oatmeal     · Eat a variety of protein foods such as seafood (fish and shellfish), lean meat, and poultry without skin (turkey and chicken)  Examples of lean meats include pork leg, shoulder, or tenderloin, and beef round, sirloin, tenderloin, and extra lean ground beef  Other protein foods include eggs and egg substitutes, beans, peas, soy products, nuts, and seeds  · Choose low-fat dairy products such as skim or 1% milk or low-fat yogurt, cheese, and cottage cheese  · Limit unhealthy fats  such as butter, hard margarine, and shortening  Exercise:  Exercise at least 30 minutes per day on most days of the week  Some examples of exercise include walking, biking, dancing, and swimming  You can also fit in more physical activity by taking the stairs instead of the elevator or parking farther away from stores  Include muscle strengthening activities 2 days each week  Regular exercise provides many health benefits  It helps you manage your weight, and decreases your risk for type 2 diabetes, heart disease, stroke, and high blood pressure  Exercise can also help improve your mood  Ask your healthcare provider about the best exercise plan for you  General health and safety guidelines:   · Do not smoke  Nicotine and other chemicals in cigarettes and cigars can cause lung damage  Ask your healthcare provider for information if you currently smoke and need help to quit  E-cigarettes or smokeless tobacco still contain nicotine  Talk to your healthcare provider before you use these products  · Limit alcohol  A drink of alcohol is 12 ounces of beer, 5 ounces of wine, or 1½ ounces of liquor  · Lose weight, if needed  Being overweight increases your risk of certain health conditions  These include heart disease, high blood pressure, type 2 diabetes, and certain types of cancer  · Protect your skin  Do not sunbathe or use tanning beds  Use sunscreen with a SPF 15 or higher  Apply sunscreen at least 15 minutes before you go outside  Reapply sunscreen every 2 hours  Wear protective clothing, hats, and sunglasses when you are outside  · Drive safely  Always wear your seatbelt  Make sure everyone in your car wears a seatbelt  A seatbelt can save your life if you are in an accident  Do not use your cell phone when you are driving  This could distract you and cause an accident  Pull over if you need to make a call or send a text message  · Practice safe sex  Use latex condoms if are sexually active and have more than one partner  Your healthcare provider may recommend screening tests for sexually transmitted infections (STIs)  · Wear helmets, lifejackets, and protective gear  Always wear a helmet when you ride a bike or motorcycle, go skiing, or play sports that could cause a head injury  Wear protective equipment when you play sports  Wear a lifejacket when you are on a boat or doing water sports  © Copyright [employer] 2021 Information is for End User's use only and may not be sold, redistributed or otherwise used for commercial purposes   All illustrations and images included in CareNotes® are the copyrighted property of [employer]  or Eliana [place]  The above information is an  only  It is not intended as medical advice for individual conditions or treatments  Talk to your doctor, nurse or pharmacist before following any medical regimen to see if it is safe and effective for you

## 2022-03-06 ENCOUNTER — LAB (OUTPATIENT)
Dept: LAB | Facility: CLINIC | Age: 52
End: 2022-03-06
Payer: COMMERCIAL

## 2022-03-06 DIAGNOSIS — I10 ESSENTIAL HYPERTENSION: ICD-10-CM

## 2022-03-06 DIAGNOSIS — Z00.00 ANNUAL PHYSICAL EXAM: ICD-10-CM

## 2022-03-06 DIAGNOSIS — Z13.29 SCREENING FOR THYROID DISORDER: ICD-10-CM

## 2022-03-06 LAB
ALBUMIN SERPL BCP-MCNC: 4.1 G/DL (ref 3.5–5)
ALP SERPL-CCNC: 85 U/L (ref 46–116)
ALT SERPL W P-5'-P-CCNC: 69 U/L (ref 12–78)
ANION GAP SERPL CALCULATED.3IONS-SCNC: 7 MMOL/L (ref 4–13)
AST SERPL W P-5'-P-CCNC: 29 U/L (ref 5–45)
BASOPHILS # BLD AUTO: 0.13 THOUSANDS/ΜL (ref 0–0.1)
BASOPHILS NFR BLD AUTO: 1 % (ref 0–1)
BILIRUB SERPL-MCNC: 0.49 MG/DL (ref 0.2–1)
BUN SERPL-MCNC: 15 MG/DL (ref 5–25)
CALCIUM SERPL-MCNC: 9.3 MG/DL (ref 8.3–10.1)
CHLORIDE SERPL-SCNC: 98 MMOL/L (ref 100–108)
CHOLEST SERPL-MCNC: 255 MG/DL
CO2 SERPL-SCNC: 29 MMOL/L (ref 21–32)
CREAT SERPL-MCNC: 0.95 MG/DL (ref 0.6–1.3)
EOSINOPHIL # BLD AUTO: 0.4 THOUSAND/ΜL (ref 0–0.61)
EOSINOPHIL NFR BLD AUTO: 4 % (ref 0–6)
ERYTHROCYTE [DISTWIDTH] IN BLOOD BY AUTOMATED COUNT: 12.4 % (ref 11.6–15.1)
EST. AVERAGE GLUCOSE BLD GHB EST-MCNC: 123 MG/DL
GFR SERPL CREATININE-BSD FRML MDRD: 92 ML/MIN/1.73SQ M
GLUCOSE P FAST SERPL-MCNC: 99 MG/DL (ref 65–99)
HBA1C MFR BLD: 5.9 %
HCT VFR BLD AUTO: 49.8 % (ref 36.5–49.3)
HDLC SERPL-MCNC: 47 MG/DL
HGB BLD-MCNC: 16.4 G/DL (ref 12–17)
IMM GRANULOCYTES # BLD AUTO: 0.05 THOUSAND/UL (ref 0–0.2)
IMM GRANULOCYTES NFR BLD AUTO: 1 % (ref 0–2)
LDLC SERPL CALC-MCNC: 186 MG/DL (ref 0–100)
LYMPHOCYTES # BLD AUTO: 2.75 THOUSANDS/ΜL (ref 0.6–4.47)
LYMPHOCYTES NFR BLD AUTO: 28 % (ref 14–44)
MCH RBC QN AUTO: 28 PG (ref 26.8–34.3)
MCHC RBC AUTO-ENTMCNC: 32.9 G/DL (ref 31.4–37.4)
MCV RBC AUTO: 85 FL (ref 82–98)
MONOCYTES # BLD AUTO: 1.14 THOUSAND/ΜL (ref 0.17–1.22)
MONOCYTES NFR BLD AUTO: 12 % (ref 4–12)
NEUTROPHILS # BLD AUTO: 5.47 THOUSANDS/ΜL (ref 1.85–7.62)
NEUTS SEG NFR BLD AUTO: 54 % (ref 43–75)
NONHDLC SERPL-MCNC: 208 MG/DL
NRBC BLD AUTO-RTO: 0 /100 WBCS
PLATELET # BLD AUTO: 289 THOUSANDS/UL (ref 149–390)
PMV BLD AUTO: 11.1 FL (ref 8.9–12.7)
POTASSIUM SERPL-SCNC: 4.2 MMOL/L (ref 3.5–5.3)
PROT SERPL-MCNC: 8.3 G/DL (ref 6.4–8.2)
RBC # BLD AUTO: 5.85 MILLION/UL (ref 3.88–5.62)
SODIUM SERPL-SCNC: 134 MMOL/L (ref 136–145)
T4 FREE SERPL-MCNC: 0.96 NG/DL (ref 0.76–1.46)
TRIGL SERPL-MCNC: 109 MG/DL
TSH SERPL DL<=0.05 MIU/L-ACNC: 0.56 UIU/ML (ref 0.36–3.74)
WBC # BLD AUTO: 9.94 THOUSAND/UL (ref 4.31–10.16)

## 2022-03-06 PROCEDURE — 80061 LIPID PANEL: CPT

## 2022-03-06 PROCEDURE — 85025 COMPLETE CBC W/AUTO DIFF WBC: CPT

## 2022-03-06 PROCEDURE — 84443 ASSAY THYROID STIM HORMONE: CPT

## 2022-03-06 PROCEDURE — 80053 COMPREHEN METABOLIC PANEL: CPT

## 2022-03-06 PROCEDURE — 36415 COLL VENOUS BLD VENIPUNCTURE: CPT

## 2022-03-06 PROCEDURE — 83036 HEMOGLOBIN GLYCOSYLATED A1C: CPT

## 2022-03-06 PROCEDURE — 84439 ASSAY OF FREE THYROXINE: CPT

## 2022-03-11 ENCOUNTER — TELEPHONE (OUTPATIENT)
Dept: FAMILY MEDICINE CLINIC | Facility: OTHER | Age: 52
End: 2022-03-11

## 2022-03-11 NOTE — TELEPHONE ENCOUNTER
----- Message from Seng Hines MD sent at 3/10/2022  8:10 PM EST -----  Good Evening Mr Davenport,    After review of your lab work, overall things look stable  Blood counts, kidney, liver and thyroid function are at baseline  The sodium (salt) level was slightly low but we can continue to monitor this for now  The hemoglobin A1c is still within the prediabetic range so I would recommend continued lifestyle modifications including healthy diet eating 5 servings of fruits vegetables per day and recommend 150 minutes of moderate exercise per week  Cholesterol levels are overall improving  Please let me know if you have any questions or concerns      Best,   Dr GONZALEZ

## 2022-03-11 NOTE — RESULT ENCOUNTER NOTE
Good Evening Mr Davenport,    After review of your lab work, overall things look stable  Blood counts, kidney, liver and thyroid function are at baseline  The sodium (salt) level was slightly low but we can continue to monitor this for now  The hemoglobin A1c is still within the prediabetic range so I would recommend continued lifestyle modifications including healthy diet eating 5 servings of fruits vegetables per day and recommend 150 minutes of moderate exercise per week  Cholesterol levels are overall improving  Please let me know if you have any questions or concerns      Best,   Dr GONZALEZ

## 2022-04-05 ENCOUNTER — TELEPHONE (OUTPATIENT)
Dept: FAMILY MEDICINE CLINIC | Facility: OTHER | Age: 52
End: 2022-04-05

## 2022-04-05 NOTE — TELEPHONE ENCOUNTER
Spoke with patient and he will call us back to schedule the appointment that Dr Guillermo Wright requested below

## 2022-04-05 NOTE — TELEPHONE ENCOUNTER
----- Message from Addie Dean MD sent at 3/25/2022  5:35 PM EDT -----  Emelia Beaver,    Spoke to patient regarding his lab work, can we please schedule him for a follow up visit in 3 months to assess how he is doing in terms of weight loss  Thanks!

## 2022-05-24 DIAGNOSIS — I10 ESSENTIAL HYPERTENSION: ICD-10-CM

## 2022-05-25 RX ORDER — LISINOPRIL 10 MG/1
10 TABLET ORAL DAILY
Qty: 90 TABLET | Refills: 1 | Status: SHIPPED | OUTPATIENT
Start: 2022-05-25 | End: 2022-08-08 | Stop reason: SDUPTHER

## 2022-08-08 DIAGNOSIS — I10 ESSENTIAL HYPERTENSION: ICD-10-CM

## 2022-08-08 RX ORDER — LISINOPRIL 10 MG/1
10 TABLET ORAL DAILY
Qty: 90 TABLET | Refills: 0 | Status: SHIPPED | OUTPATIENT
Start: 2022-08-08

## 2022-11-15 DIAGNOSIS — I10 ESSENTIAL HYPERTENSION: ICD-10-CM

## 2022-11-15 RX ORDER — LISINOPRIL 10 MG/1
10 TABLET ORAL DAILY
Qty: 90 TABLET | Refills: 0 | Status: SHIPPED | OUTPATIENT
Start: 2022-11-15

## 2023-04-20 PROBLEM — M21.41 FLAT FEET, BILATERAL: Status: ACTIVE | Noted: 2023-04-20

## 2023-04-20 PROBLEM — E66.3 OVERWEIGHT (BMI 25.0-29.9): Status: ACTIVE | Noted: 2023-04-20

## 2023-04-20 PROBLEM — R73.03 PREDIABETES: Status: ACTIVE | Noted: 2023-04-20

## 2023-04-20 PROBLEM — M54.50 BILATERAL LOW BACK PAIN: Status: ACTIVE | Noted: 2023-04-20

## 2023-04-20 PROBLEM — R22.1 NECK MASS: Status: ACTIVE | Noted: 2023-04-20

## 2023-04-20 PROBLEM — M21.42 FLAT FEET, BILATERAL: Status: ACTIVE | Noted: 2023-04-20

## 2023-04-29 ENCOUNTER — APPOINTMENT (OUTPATIENT)
Dept: LAB | Facility: CLINIC | Age: 53
End: 2023-04-29

## 2023-04-29 DIAGNOSIS — I10 ESSENTIAL HYPERTENSION: ICD-10-CM

## 2023-04-29 DIAGNOSIS — R73.03 PREDIABETES: ICD-10-CM

## 2023-04-29 DIAGNOSIS — Z76.89 ENCOUNTER TO ESTABLISH CARE: ICD-10-CM

## 2023-04-29 DIAGNOSIS — E78.2 MODERATE MIXED HYPERLIPIDEMIA NOT REQUIRING STATIN THERAPY: ICD-10-CM

## 2023-04-29 DIAGNOSIS — Z12.5 PROSTATE CANCER SCREENING: ICD-10-CM

## 2023-04-29 LAB
ALBUMIN SERPL BCP-MCNC: 4.3 G/DL (ref 3.5–5)
ALP SERPL-CCNC: 69 U/L (ref 34–104)
ALT SERPL W P-5'-P-CCNC: 37 U/L (ref 7–52)
ANION GAP SERPL CALCULATED.3IONS-SCNC: 7 MMOL/L (ref 4–13)
AST SERPL W P-5'-P-CCNC: 19 U/L (ref 13–39)
BASOPHILS # BLD AUTO: 0.17 THOUSANDS/ΜL (ref 0–0.1)
BASOPHILS NFR BLD AUTO: 2 % (ref 0–1)
BILIRUB SERPL-MCNC: 0.66 MG/DL (ref 0.2–1)
BUN SERPL-MCNC: 15 MG/DL (ref 5–25)
CALCIUM SERPL-MCNC: 9.3 MG/DL (ref 8.4–10.2)
CHLORIDE SERPL-SCNC: 102 MMOL/L (ref 96–108)
CHOLEST SERPL-MCNC: 210 MG/DL
CO2 SERPL-SCNC: 27 MMOL/L (ref 21–32)
CREAT SERPL-MCNC: 0.93 MG/DL (ref 0.6–1.3)
EOSINOPHIL # BLD AUTO: 0.44 THOUSAND/ΜL (ref 0–0.61)
EOSINOPHIL NFR BLD AUTO: 5 % (ref 0–6)
ERYTHROCYTE [DISTWIDTH] IN BLOOD BY AUTOMATED COUNT: 12.5 % (ref 11.6–15.1)
GFR SERPL CREATININE-BSD FRML MDRD: 94 ML/MIN/1.73SQ M
GLUCOSE P FAST SERPL-MCNC: 97 MG/DL (ref 65–99)
HCT VFR BLD AUTO: 50.7 % (ref 36.5–49.3)
HDLC SERPL-MCNC: 37 MG/DL
HGB BLD-MCNC: 16.6 G/DL (ref 12–17)
IMM GRANULOCYTES # BLD AUTO: 0.05 THOUSAND/UL (ref 0–0.2)
IMM GRANULOCYTES NFR BLD AUTO: 1 % (ref 0–2)
LDLC SERPL CALC-MCNC: 148 MG/DL (ref 0–100)
LYMPHOCYTES # BLD AUTO: 2.48 THOUSANDS/ΜL (ref 0.6–4.47)
LYMPHOCYTES NFR BLD AUTO: 27 % (ref 14–44)
MCH RBC QN AUTO: 28.5 PG (ref 26.8–34.3)
MCHC RBC AUTO-ENTMCNC: 32.7 G/DL (ref 31.4–37.4)
MCV RBC AUTO: 87 FL (ref 82–98)
MONOCYTES # BLD AUTO: 1.07 THOUSAND/ΜL (ref 0.17–1.22)
MONOCYTES NFR BLD AUTO: 12 % (ref 4–12)
NEUTROPHILS # BLD AUTO: 4.92 THOUSANDS/ΜL (ref 1.85–7.62)
NEUTS SEG NFR BLD AUTO: 53 % (ref 43–75)
NONHDLC SERPL-MCNC: 173 MG/DL
NRBC BLD AUTO-RTO: 0 /100 WBCS
PLATELET # BLD AUTO: 280 THOUSANDS/UL (ref 149–390)
PMV BLD AUTO: 10.3 FL (ref 8.9–12.7)
POTASSIUM SERPL-SCNC: 4.5 MMOL/L (ref 3.5–5.3)
PROT SERPL-MCNC: 7.5 G/DL (ref 6.4–8.4)
RBC # BLD AUTO: 5.83 MILLION/UL (ref 3.88–5.62)
SODIUM SERPL-SCNC: 136 MMOL/L (ref 135–147)
TRIGL SERPL-MCNC: 125 MG/DL
WBC # BLD AUTO: 9.13 THOUSAND/UL (ref 4.31–10.16)

## 2023-05-02 LAB
EST. AVERAGE GLUCOSE BLD GHB EST-MCNC: 120 MG/DL
HBA1C MFR BLD: 5.8 %

## 2023-07-17 ENCOUNTER — HOSPITAL ENCOUNTER (OUTPATIENT)
Dept: ULTRASOUND IMAGING | Facility: HOSPITAL | Age: 53
Discharge: HOME | End: 2023-07-17
Payer: COMMERCIAL

## 2023-07-17 DIAGNOSIS — R22.1 NECK MASS: ICD-10-CM

## 2023-07-17 PROCEDURE — 76536 US EXAM OF HEAD AND NECK: CPT

## 2023-07-26 ENCOUNTER — OFFICE VISIT (OUTPATIENT)
Age: 53
End: 2023-07-26
Payer: COMMERCIAL

## 2023-07-26 VITALS
WEIGHT: 190 LBS | BODY MASS INDEX: 28.14 KG/M2 | DIASTOLIC BLOOD PRESSURE: 80 MMHG | TEMPERATURE: 98.7 F | SYSTOLIC BLOOD PRESSURE: 132 MMHG | RESPIRATION RATE: 18 BRPM | HEIGHT: 69 IN | HEART RATE: 74 BPM | OXYGEN SATURATION: 98 %

## 2023-07-26 DIAGNOSIS — M54.50 ACUTE BILATERAL LOW BACK PAIN WITHOUT SCIATICA: Primary | ICD-10-CM

## 2023-07-26 PROCEDURE — 99213 OFFICE O/P EST LOW 20 MIN: CPT | Performed by: EMERGENCY MEDICINE

## 2023-07-26 RX ORDER — PREDNISONE 10 MG/1
TABLET ORAL
Qty: 27 TABLET | Refills: 0 | Status: SHIPPED | OUTPATIENT
Start: 2023-07-26

## 2023-07-26 RX ORDER — CYCLOBENZAPRINE HCL 10 MG
10 TABLET ORAL 3 TIMES DAILY PRN
Qty: 20 TABLET | Refills: 0 | Status: SHIPPED | OUTPATIENT
Start: 2023-07-26

## 2023-07-26 NOTE — PATIENT INSTRUCTIONS
Crucifix stretch (or sometimes also referred to as “iron cross stretch”), as demonstrated on awakening and while still in bed, then activate your deep core muscles by imagining that you are pinning your navel to your spine and hold for reps of 10 seconds as discussed. Core bracing as demonstrated  Squat stretch as demonstrated  Hanging traction as discussed  [employer] up as discussed  Bird Dog Core exercise as discussed  Glute bridge as discussed  Side Bridge as discussed  Use heat 10 - 15 minutes every 2 - 3 hrs especially before stretching but may use ice for reducing inflammation. Hold any NSAID's like Ibuprofen (Advil), Naprosyn (Aleve), etc while on steroids like Medrol or Prednisone    Dolor de espalda   LO QUE NECESITA SABER:   El dolor de espalda es común. Usted puede tener dolor de espalda y espasmos musculares. Usted puede estar adolorido o sentir rigidez en karsten o ambos lados de la espalda. El dolor se puede propagar a la parte baja del cuerpo. Las condiciones que afectan la columna, las articulaciones, o los músculos pueden causar el dolor de espalda. Estos pueden incluir la artritis, estenosis de la salomón dorsal (estrechamiento de la columna vertebral), tensión muscular o descomposición de los discos de la columna vertebral.  INSTRUCCIONES SOBRE EL [facility] HOSPITALARIA:   Llame al número de emergencias local (911 en los [place]) si:  Usted tiene dolor de espalda intenso con dolor en el pecho. Usted no puede controlar bella orina ni ginger deposiciones intestinales. El dolor se vuelve tan intenso que lo incapacita para caminar. Regrese a la zhanna de emergencias si:  Usted tiene dolor, entumecimiento o debilidad en james o en ambas piernas. Usted tiene dolor de espalda intenso, náuseas y vómito.     Usted tiene un dolor de espalda intenso que se propaga a un costado o al área genital.    Llame a bella médico si:  Usted tiene dolor de espalda que no mejora con el reposo, ni con el medicamento para el dolor. Tiene fiebre. Usted tiene un dolor que empeora cuando está acostado boca A[employer] Dipti o al descansar. Usted tiene [employer] cuando tose o estornuda. Usted pierde peso sin proponérselo. Usted tiene preguntas o inquietudes acerca de bella condición o cuidado. Medicamentos: Es posible que usted necesite alguno de los siguientes:  KIANNA pueden disminuir la inflamación y el dolor o la fiebre. Anum medicamento está disponible con o sin james receta médica. Los KIANNA pueden causar sangrado estomacal o problemas renales en ciertas personas. Si usted carolina un medicamento anticoagulante, siempre pregúntele a bella médico si los KIANNA son seguros para usted. Siempre tali la etiqueta de anum medicamento y [address] instrucciones. Acetaminofén [employer] dolor y baja la fiebre. Está disponible sin receta médica. Pregunte la cantidad y la frecuencia con que debe tomarlos. [address] Tali las etiquetas de todos los demás medicamentos que esté usando para saber si también contienen acetaminofén, o pregunte a bella médico o farmacéutico. El acetaminofén puede causar daño en el hígado cuando no se carolina de forma correcta. Relajantes musculares ayudan a disminuir los espasmos musculares y el dolor de espalda. Puede administrarse podrían administrarse. Pregunte al médico cómo debe talia anum medicamento de forma wiggins. Algunos medicamentos recetados para el dolor contienen acetaminofén. No tome otros medicamentos que contengan acetaminofén sin consultarlo con bella médico. Demasiado acetaminofeno puede causar daño al hígado. Los medicamentos recetados para el dolor podrían causar estreñimiento. Pregunte a bella médico brandy prevenir o tratar estreñimiento. [place] ginger medicamentos brandy se le haya indicado. Consulte con bella médico si usted isael que bella medicamento no le está ayudando o si presenta efectos secundarios. Infórmele al médico si usted es alérgico a algún medicamento.  Mantenga james lista actualizada de los medicamentos, las vitaminas y los productos herbales que carolina. Incluya los siguientes datos de los medicamentos: cantidad, frecuencia y motivo de administración. Traiga con usted la lista o los envases de las píldoras a ginger citas de seguimiento. Lleve la lista de los medicamentos con usted en erum de jmaes emergencia. La forma de controlar bella dolor de espalda:  Aplique hielo en la espalda de 15 a 20 minutos cada hora o brandy se le indique. Use james compresa de hielo o ponga hielo triturado en james bolsa de plástico. Vibha Ramirez con james toalla antes de aplicarlo sobre bella piel. El hielo disminuye el dolor y ayuda a evitar el daño en los tejidos. Aplique calor en la espalda de 20 a 30 minutos cada 2 horas por los [employer] indiquen. El calor ayuda a disminuir el dolor y los espasmos musculares. Manténgase activo lo más que pueda sin causar más dolor. El reposo en cama puede empeorar bella dolor de espalda. Evite levantar objetos hasta que ya no tenga dolor. Vaya a terapia física según las indicaciones. Un fisioterapeuta puede enseñarle ejercicios que contribuyan a mejorar bella movimiento y fuerza, y a aliviar el dolor. Acuda a james consulta de control con bella médico dentro de 2 semanas, o según le hayan indicado: Es posible que necesite david a un especialista. Anote ginger preguntas para que se acuerde de hacerlas tucker ginger visitas. © Copyright Zelphia Bryan 2022 Information is for End User's use only and may not be sold, redistributed or otherwise used for commercial purposes. Esta información es sólo para uso en educación. Bella intención no es darle un consejo médico sobre enfermedades o tratamientos. Colsulte con bella Celina Rubinstein farmacéutico antes de seguir cualquier régimen médico para saber si es seguro y efectivo para usted.

## 2023-07-26 NOTE — PROGRESS NOTES
[facility]        NAME: Saúl Del Angel is a 47 y.o. male  : 1970    MRN: [id number]  DATE: 2023  TIME: 10:09 AM    Assessment and Plan   Acute bilateral low back pain without sciatica [M54.50]  1. Acute bilateral low back pain without sciatica  cyclobenzaprine (FLEXERIL) 10 mg tablet    predniSONE 10 mg tablet    Ambulatory Referral to Physical Therapy            Patient Instructions     Patient Instructions       Crucifix stretch (or sometimes also referred to as “iron cross stretch”), as demonstrated on awakening and while still in bed, then activate your deep core muscles by imagining that you are pinning your navel to your spine and hold for reps of 10 seconds as discussed. Core bracing as demonstrated  Squat stretch as demonstrated  Hanging traction as discussed  [employer] up as discussed  Bird Dog Core exercise as discussed  Glute bridge as discussed  Side Bridge as discussed  Use heat 10 - 15 minutes every 2 - 3 hrs especially before stretching but may use ice for reducing inflammation. Hold any NSAID's like Ibuprofen (Advil), Naprosyn (Aleve), etc while on steroids like Medrol or Prednisone    Dolor de espalda   LO QUE NECESITA SABER:   El dolor de espalda es común. Usted puede tener dolor de espalda y espasmos musculares. Usted puede estar adolorido o sentir rigidez en karsten o ambos lados de la espalda. El dolor se puede propagar a la parte baja del cuerpo. Las condiciones que afectan la columna, las articulaciones, o los músculos pueden causar el dolor de espalda. Estos pueden incluir la artritis, estenosis de la salomón dorsal (estrechamiento de la columna vertebral), tensión muscular o descomposición de los discos de la columna vertebral.  INSTRUCCIONES SOBRE EL [facility]OSPITALARIA:   Llame al número de emergencias local (911 en los [place]) si:  • Usted tiene dolor de espalda intenso con dolor en el pecho.     • Usted no puede controlar bella orina ni ginger deposiciones intestinales. • El dolor se vuelve tan intenso que lo incapacita para caminar. Regrese a la zhanna de emergencias si:  • Usted tiene dolor, entumecimiento o debilidad en james o en ambas piernas. • Usted tiene dolor de espalda intenso, náuseas y vómito. • Usted tiene un dolor de espalda intenso que se propaga a un costado o al área genital.    Llame a bella médico si:  • Usted tiene dolor de espalda que no mejora con el reposo, ni con el medicamento para el dolor. • Tiene fiebre. • Usted tiene un dolor que empeora cuando está acostado boca Sharrie Breeding o al descansar. • Usted tiene dolor que empeora cuando tose o estornuda. • Usted pierde peso sin proponérselo. • Usted tiene preguntas o inquietudes acerca de bella condición o cuidado. Medicamentos: Es posible que usted necesite alguno de los siguientes:  • KIANNA pueden disminuir la inflamación y el dolor o la fiebre. Maximo medicamento está disponible con o sin james receta médica. Los KIANNA pueden causar sangrado estomacal o problemas renales en ciertas personas. Si usted carolina un medicamento anticoagulante, siempre pregúntele a bella médico si los KIANNA son seguros para usted. Siempre tali la etiqueta de maximo medicamento y [address] instrucciones. • Acetaminofén lilian el dolor y baja la fiebre. Está disponible sin receta médica. Pregunte la cantidad y la frecuencia con que debe tomarlos. [address] Tali las etiquetas de todos los demás medicamentos que esté usando para saber si también contienen acetaminofén, o pregunte a bella médico o farmacéutico. El acetaminofén puede causar daño en el hígado cuando no se carolina de forma correcta. • Relajantes musculares ayudan a disminuir los espasmos musculares y el dolor de espalda. • Puede administrarse podrían administrarse. Pregunte al médico cómo debe talia maximo medicamento de forma wiggins. Algunos medicamentos recetados para el dolor contienen acetaminofén.  No tome otros medicamentos que contengan acetaminofén sin consultarlo con bella médico. Demasiado acetaminofeno puede causar daño al hígado. Los medicamentos recetados para el dolor podrían causar estreñimiento. Pregunte a bella médico brandy prevenir o tratar estreñimiento. • [place] ginger medicamentos brandy se le haya indicado. Consulte con bella médico si usted isael que bella medicamento no le está ayudando o si presenta efectos secundarios. Infórmele al médico si usted es alérgico a algún medicamento. Mantenga james lista actualizada de los [place], las vitaminas y los productos herbales que carolina. Incluya los siguientes datos de los medicamentos: cantidad, frecuencia y motivo de administración. Traiga con usted la lista o los envases de las píldoras a ginger citas de seguimiento. Lleve la lista de los medicamentos con usted en erum de james emergencia. La forma de controlar bella dolor de espalda:  • Aplique hielo en la espalda de 15 a 20 minutos cada hora o brandy se le indique. Use james compresa de hielo o ponga hielo triturado en james bolsa de plástico. Orvan Meres con james toalla antes de aplicarlo sobre bella piel. El hielo disminuye el dolor y ayuda a evitar el daño en los tejidos. • Aplique calor en la espalda de 20 a 30 minutos cada 2 horas por los [employer] indiquen. El calor ayuda a disminuir el dolor y los espasmos musculares. • Manténgase activo lo más que pueda sin causar [employer]. El reposo en cama puede empeorar bella dolor de espalda. Evite levantar objetos hasta que ya no tenga dolor. • Vaya a terapia física según las indicaciones. Un fisioterapeuta puede enseñarle ejercicios que contribuyan a mejorar bella movimiento y fuerza, y a aliviar el dolor. Acuda a james consulta de control con bella médico dentro de 2 semanas, o según le hayan indicado: Es posible que necesite david a un especialista. Anote ginger preguntas para que se acuerde de hacerlas tucker ginger visitas.   © Copyright Chano Dionna 2022 Information is for End User's use only and may not be sold, redistributed or otherwise used for commercial purposes. Esta información es sólo para uso en educación. Bella intención no es darle un consejo médico sobre enfermedades o tratamientos. Colsulte con bella Nancy Capps farmacéutico antes de seguir cualquier régimen médico para saber si es seguro y efectivo para usted. Follow up with PCP in 3-5 days. Proceed to  ER if symptoms worsen. Chief Complaint     Chief Complaint   Patient presents with   • Back Pain     Lower back pain x 3 days. Patient states there is a pump in that location          History of Present Illness       Patient complains of gradual onset bilateral low back pain worse on the right starting 3 days ago after installing a fence. He denies history of prior low back problems. He denies any radiation of the pain but wife notes a lump in his right low back when she tries to massage his back. Review of Systems   Review of Systems   Constitutional: Negative for chills and fever. Respiratory: Negative. Cardiovascular: Negative. Musculoskeletal: Positive for back pain. Skin: Negative for rash. Neurological: Negative for weakness and numbness.          Current Medications       Current Outpatient Medications:   •  cyclobenzaprine (FLEXERIL) 10 mg tablet, Take 1 tablet (10 mg total) by mouth 3 (three) times a day as needed for muscle spasms, Disp: 20 tablet, Rfl: 0  •  lisinopril (ZESTRIL) 10 mg tablet, Take 1 tablet (10 mg total) by mouth daily, Disp: 90 tablet, Rfl: 0  •  predniSONE 10 mg tablet, Take once daily all days pills on this schedule 6- 6- 5- 4- 3- 2- 1, Disp: 27 tablet, Rfl: 0    Current Allergies     Allergies as of 07/26/2023   • (No Known Allergies)            The following portions of the patient's history were reviewed and updated as appropriate: allergies, current medications, past family history, past medical history, past social history, past surgical history and problem list.     Past Medical History: Diagnosis Date   • Hypertension        Past Surgical History:   Procedure Laterality Date   • NO PAST SURGERIES         Family History   Problem Relation Age of Onset   • Hypertension Father    • Heart disease Father    • Hypertension Sister    • Hypertension Brother    • Hypertension Sister          Medications have been verified. Objective   /80   Pulse 74   Temp 98.7 °F (37.1 °C) (Tympanic)   Resp 18   Ht 5' 9" (1.753 m)   Wt 86.2 kg (190 lb)   SpO2 98%   BMI 28.06 kg/m²        Physical Exam     Physical Exam  Vitals and nursing note reviewed. Constitutional:       General: He is not in acute distress. Appearance: He is well-developed. Cardiovascular:      Rate and Rhythm: Normal rate and regular rhythm. Pulmonary:      Effort: Pulmonary effort is normal.      Breath sounds: Normal breath sounds. Musculoskeletal:         General: Tenderness present. No deformity. Cervical back: Neck supple. Comments: Tender paravertebral muscles bilateral low back worse on the right, lumbar flexion limited due to pain, lumbar extension full but painful, left and right lumbar rotation full but painful   Skin:     General: Skin is warm and dry. Findings: No erythema or rash. Neurological:      Mental Status: He is alert and oriented to person, place, and time. Deep Tendon Reflexes: Reflexes are normal and symmetric. Psychiatric:         Mood and Affect: Mood normal.         Behavior: Behavior normal.         Thought Content:  Thought content normal.         Judgment: Judgment normal.

## 2023-08-02 ENCOUNTER — TELEPHONE (OUTPATIENT)
Age: 53
End: 2023-08-02

## 2023-08-02 NOTE — TELEPHONE ENCOUNTER
Spoke to patient regarding appointment scheduled at 4 pm.  Patient missed appointment. Patient apologized and stated he will call back to schedule.

## 2023-08-09 DIAGNOSIS — I10 ESSENTIAL HYPERTENSION: ICD-10-CM

## 2023-08-09 RX ORDER — LISINOPRIL 10 MG/1
10 TABLET ORAL DAILY
Qty: 90 TABLET | Refills: 1 | Status: SHIPPED | OUTPATIENT
Start: 2023-08-09

## 2023-08-09 NOTE — TELEPHONE ENCOUNTER
Patients wife called requesting a refill of  lisinopril (ZESTRIL) 10 mg tablet Take 1 tablet (10 mg total) by mouth daily      Please send to [facility].

## 2023-09-07 RX ORDER — ATORVASTATIN CALCIUM 10 MG/1
10 TABLET, FILM COATED ORAL DAILY
Qty: 30 TABLET | Refills: 2 | Status: CANCELLED | OUTPATIENT
Start: 2023-09-07

## 2023-09-07 NOTE — PROGRESS NOTES
Name: Gael Arrieta      : 1970      MRN: [id number]  Encounter Provider: ALYSE Lao  Encounter Date: 2023   Encounter department: [facility] PRIMARY [facility]ARE    Assessment & Plan     1. Mixed hyperlipidemia  Assessment & Plan:  LDL still elevated but improved. Patient lost approx 4 lbs since last visit. Patient working on improving diet. ASCVD risk score intermediate. Declined starting a statin today. Will continue lifestyle modification and recheck lipid panel in 6 months    Orders:  -     Lipid Panel with Direct LDL reflex; Future    2. Essential hypertension  Assessment & Plan:  Blood pressure elevated today in the 748E systolically. Blood pressure was rechecked manually, similar result. Patient reports that he had a large cup of coffee prior to coming into the office today. Patient does not wish to increase his lisinopril today, the medication was refilled. The patient verbalized agreement to the following: If blood pressure is still elevated at 6-month follow-up, will increase lisinopril from 10 to 20 mg. Orders:  -     lisinopril (ZESTRIL) 10 mg tablet; Take 1 tablet (10 mg total) by mouth daily    3. Neck mass  Assessment & Plan:  Still present, does not cause pain. Recent U/S suggested a lipoma. Declined a referral to dermatology at this time. Will f/u if mass grows in size or causes pain. 4. Bilateral low back pain, unspecified chronicity, unspecified whether sciatica present  Assessment & Plan:  Now resolved, will continue to monitor.        5. Prediabetes  Assessment & Plan:  A1C slightly improved  Encouraged continued lifestyle modification  Will recheck A1C prior to 6 month f/u    Orders:  -     Hemoglobin A1C; Future      I have spent a total time of 30 minutes on 23 in caring for this patient including Risks and benefits of tx options, Instructions for management, Patient and family education, Importance of tx compliance and Risk factor reductions. Subjective      Routine follow-up to discuss recent lab work and reassess chronic conditions  Primary concern today: discuss test results    [address]  [id number]    Review of Systems   Constitutional: Negative. HENT: Negative. Negative for nosebleeds. Eyes: Negative. Respiratory: Negative. Cardiovascular: Negative. Negative for chest pain. Gastrointestinal: Negative. Endocrine: Negative. Genitourinary: Negative. Musculoskeletal: Negative. Negative for back pain. Skin: Negative. Allergic/Immunologic: Negative. Neurological: Negative. Negative for headaches. Hematological: Negative. Psychiatric/Behavioral: Negative. Current Outpatient Medications on File Prior to Visit   Medication Sig   • cyclobenzaprine (FLEXERIL) 10 mg tablet Take 1 tablet (10 mg total) by mouth 3 (three) times a day as needed for muscle spasms   • [DISCONTINUED] lisinopril (ZESTRIL) 10 mg tablet Take 1 tablet (10 mg total) by mouth daily   • [DISCONTINUED] predniSONE 10 mg tablet Take once daily all days pills on this schedule 6- 6- 5- 4- 3- 2- 1 (Patient not taking: Reported on 9/8/2023)       Objective     /90 (BP Location: Left arm, Patient Position: Sitting, Cuff Size: Large)   Pulse 74   Temp 98.4 °F (36.9 °C) (Tympanic)   Resp 16   Ht 5' 9" (1.753 m)   Wt 84.6 kg (186 lb 8.2 oz)   SpO2 98%   BMI 27.54 kg/m²     Physical Exam  Constitutional:       General: He is not in acute distress. Appearance: Normal appearance. He is not ill-appearing, toxic-appearing or diaphoretic. Eyes:      Extraocular Movements: Extraocular movements intact. Conjunctiva/sclera: Conjunctivae normal.   Cardiovascular:      Rate and Rhythm: Normal rate and regular rhythm. Heart sounds: Normal heart sounds. Pulmonary:      Effort: Pulmonary effort is normal. No respiratory distress. Breath sounds: Normal breath sounds. No wheezing. Abdominal:      General: Abdomen is flat. Musculoskeletal:         General: Normal range of motion. Cervical back: Normal range of motion. Skin:     Findings: No erythema or rash. Neurological:      Mental Status: He is alert and oriented to person, place, and time.    Psychiatric:         Mood and Affect: Mood normal.         Behavior: Behavior normal.       ALYSE Chase

## 2023-09-08 ENCOUNTER — OFFICE VISIT (OUTPATIENT)
Dept: PODIATRY | Facility: CLINIC | Age: 53
End: 2023-09-08
Payer: COMMERCIAL

## 2023-09-08 ENCOUNTER — OFFICE VISIT (OUTPATIENT)
Age: 53
End: 2023-09-08
Payer: COMMERCIAL

## 2023-09-08 VITALS
HEART RATE: 68 BPM | HEIGHT: 69 IN | SYSTOLIC BLOOD PRESSURE: 144 MMHG | DIASTOLIC BLOOD PRESSURE: 101 MMHG | BODY MASS INDEX: 24.73 KG/M2 | WEIGHT: 167 LBS

## 2023-09-08 VITALS
HEART RATE: 74 BPM | WEIGHT: 186.51 LBS | SYSTOLIC BLOOD PRESSURE: 142 MMHG | RESPIRATION RATE: 16 BRPM | TEMPERATURE: 98.4 F | BODY MASS INDEX: 27.62 KG/M2 | DIASTOLIC BLOOD PRESSURE: 90 MMHG | HEIGHT: 69 IN | OXYGEN SATURATION: 98 %

## 2023-09-08 DIAGNOSIS — M72.2 PLANTAR FASCIITIS: ICD-10-CM

## 2023-09-08 DIAGNOSIS — R73.03 PREDIABETES: ICD-10-CM

## 2023-09-08 DIAGNOSIS — M21.41 FLAT FEET, BILATERAL: Primary | ICD-10-CM

## 2023-09-08 DIAGNOSIS — I10 ESSENTIAL HYPERTENSION: ICD-10-CM

## 2023-09-08 DIAGNOSIS — E78.2 MIXED HYPERLIPIDEMIA: Primary | ICD-10-CM

## 2023-09-08 DIAGNOSIS — R22.1 NECK MASS: ICD-10-CM

## 2023-09-08 DIAGNOSIS — M21.42 FLAT FEET, BILATERAL: Primary | ICD-10-CM

## 2023-09-08 DIAGNOSIS — M54.50 BILATERAL LOW BACK PAIN, UNSPECIFIED CHRONICITY, UNSPECIFIED WHETHER SCIATICA PRESENT: ICD-10-CM

## 2023-09-08 PROCEDURE — 99214 OFFICE O/P EST MOD 30 MIN: CPT | Performed by: NURSE PRACTITIONER

## 2023-09-08 PROCEDURE — 99243 OFF/OP CNSLTJ NEW/EST LOW 30: CPT | Performed by: PODIATRIST

## 2023-09-08 RX ORDER — LISINOPRIL 10 MG/1
10 TABLET ORAL DAILY
Qty: 90 TABLET | Refills: 1 | Status: SHIPPED | OUTPATIENT
Start: 2023-09-08

## 2023-09-08 NOTE — ASSESSMENT & PLAN NOTE
LDL still elevated but improved. Patient lost approx 4 lbs since last visit. Patient working on improving diet. ASCVD risk score intermediate. Declined starting a statin today.    Will continue lifestyle modification and recheck lipid panel in 6 months

## 2023-09-08 NOTE — ASSESSMENT & PLAN NOTE
Still present, does not cause pain. Recent U/S suggested a lipoma. Declined a referral to dermatology at this time. Will f/u if mass grows in size or causes pain.

## 2023-09-08 NOTE — ASSESSMENT & PLAN NOTE
A1C slightly improved  Encouraged continued lifestyle modification  Will recheck A1C prior to 6 month f/u

## 2023-09-08 NOTE — ASSESSMENT & PLAN NOTE
Blood pressure elevated today in the 926G systolically. Blood pressure was rechecked manually, similar result. Patient reports that he had a large cup of coffee prior to coming into the office today. Patient does not wish to increase his lisinopril today, the medication was refilled. The patient verbalized agreement to the following: If blood pressure is still elevated at 6-month follow-up, will increase lisinopril from 10 to 20 mg.

## 2023-09-08 NOTE — LETTER
September 8, 2023     Em Baxter, [address]  [address] [address]    Patient: Suly Prieto   YOB: 1970   Date of Visit: 9/8/2023       Dear Dr. Jannette Meyers:    Thank you for referring Suly Prieto to me for evaluation. Below are my notes for this consultation. If you have questions, please do not hesitate to call me. I look forward to following your patient along with you. Sincerely,        America Zhou DPM        CC: No Recipients    LILI Wilcox [facility]  9/8/2023  4:43 PM  Sign when Signing Visit                 PATIENT:  Suly Prieto  1970       ASSESSMENT:    1. Flat feet, bilateral  Ambulatory Referral to Podiatry    Ambulatory referral to Physical Therapy    Device prior authorization      2. Plantar fasciitis  Ambulatory referral to Physical Therapy    Device prior authorization      3. Bilateral low back pain, unspecified chronicity, unspecified whether sciatica present  Ambulatory Referral to Podiatry               PLAN:  1. Reviewed medical records. Reviewed the note from PCP. Patient was counseled and educated on the condition and the diagnosis. 2. The diagnosis, treatment options and prognosis were discussed with the patient. 3. He has pes planus with symptoms of plantar fasciitis. Sent him for PT/OT. 4. Recommended orthotics and will call his insurance for benefits. 5. Instructed supportive care, home exercise, icing, and proper footwear/ arch support. Discussed possible images depending on the progress. 6. Patient will return in 6 weeks for re-evaluation. Imaging: I have personally reviewed pertinent films in PACS  Labs, pathology, and Other Studies: I have personally reviewed pertinent reports. Subjective:       HPI  The patient was referred to my office for evaluation of bilateral foot pain and flatfoot. He had pain in arch for years. Pain is about 3-7 out of 10 depending on the days. He related this to flatfoot. Denied any self treatment other than taking OTC meds as needed. He also has some post-static dyskinesia in arch bilaterally. No acute swelling. No redness or warmth. The patient does not recall any injury. No associated numbness or paresthesia. No significant weakness or dysfunction. The following portions of the patient's history were reviewed and updated as appropriate: allergies, current medications, past family history, past medical history, past social history, past surgical history and problem list.  All pertinent labs and images were reviewed. Past Medical History  Past Medical History:   Diagnosis Date   • Hypertension        Past Surgical History  Past Surgical History:   Procedure Laterality Date   • NO PAST SURGERIES          Allergies:  Patient has no known allergies. Medications:  Current Outpatient Medications   Medication Sig Dispense Refill   • cyclobenzaprine (FLEXERIL) 10 mg tablet Take 1 tablet (10 mg total) by mouth 3 (three) times a day as needed for muscle spasms 20 tablet 0   • lisinopril (ZESTRIL) 10 mg tablet Take 1 tablet (10 mg total) by mouth daily 90 tablet 1     No current facility-administered medications for this visit. Social History:  Social History     Socioeconomic History   • Marital status: /Civil Union     Spouse name: None   • Number of children: None   • Years of education: None   • Highest education level: None   Occupational History   • None   Tobacco Use   • Smoking status: Never   • Smokeless tobacco: Never   Vaping Use   • Vaping Use: Never used   Substance and Sexual Activity   • Alcohol use:  Yes     Alcohol/week: 1.0 standard drink of alcohol     Types: 1 Shots of liquor per week     Comment: weekends   • Drug use: Never   • Sexual activity: None   Other Topics Concern   • None   Social History Narrative    · Most recent tobacco use screenin2019      · Do you currently or have you served in the [place] [employer]:   No      · Were you activated, into active duty, as a member of the [employer] or as a Reservist:   No      Social Determinants of Health     Financial Resource Strain: Not on [employer] Insecurity: Not on file   Transportation Needs: Not on file   Physical Activity: Not on file   Stress: Not on file   Social Connections: Not on file   Intimate Partner Violence: Not on file   Housing Stability: Not on file          Review of Systems   Constitutional: Negative for chills and fever. HENT: Negative for sore throat. Respiratory: Negative for cough and shortness of breath. Cardiovascular: Negative for chest pain. Gastrointestinal: Negative for nausea and vomiting. Musculoskeletal: Negative for gait problem. Skin: Negative for wound. Allergic/Immunologic: Negative for immunocompromised state. Neurological: Negative for weakness and numbness. Hematological: Negative. Psychiatric/Behavioral: Negative for behavioral problems and confusion. Objective:      BP (!) 144/101   Pulse 68   Ht 5' 9" (1.753 m)   Wt 75.8 kg (167 lb) Comment: last weight entered was incorrect  BMI 24.66 kg/m²         Physical Exam  Vitals reviewed. Constitutional:       General: He is not in acute distress. Appearance: Normal appearance. He is not toxic-appearing or diaphoretic. HENT:      Head: Normocephalic and atraumatic. Eyes:      Extraocular Movements: Extraocular movements intact. Cardiovascular:      Rate and Rhythm: Normal rate and regular rhythm. Pulses: Normal pulses. Dorsalis pedis pulses are 2+ on the right side and 2+ on the left side. Posterior tibial pulses are 2+ on the right side and 2+ on the left side. Pulmonary:      Effort: Pulmonary effort is normal. No respiratory distress. Musculoskeletal:         General: Deformity present. No swelling, tenderness or signs of injury. Cervical back: Normal range of motion and neck supple. Right lower leg: No edema. Left lower leg: No edema. Right foot: No foot drop. Left foot: No foot drop. Comments: Pes planus presents bilaterally. No reproducible pain on palpation today. PTT function intact. Skin:     General: Skin is warm and dry. Capillary Refill: Capillary refill takes less than 2 seconds. Coloration: Skin is not cyanotic or mottled. Findings: No abscess, ecchymosis or wound. Nails: There is no clubbing. Neurological:      General: No focal deficit present. Mental Status: He is alert and oriented to person, place, and time. Cranial Nerves: No cranial nerve deficit. Sensory: No sensory deficit. Motor: No weakness. Coordination: Coordination normal.   Psychiatric:         Mood and Affect: Mood normal.         Behavior: Behavior normal.         Thought Content:  Thought content normal.         Judgment: Judgment normal.

## 2023-09-08 NOTE — PROGRESS NOTES
PATIENT:  Joo Snyder  1970       ASSESSMENT:     1. Flat feet, bilateral  Ambulatory Referral to Podiatry    Ambulatory referral to Physical Therapy    Device prior authorization      2. Plantar fasciitis  Ambulatory referral to Physical Therapy    Device prior authorization      3. Bilateral low back pain, unspecified chronicity, unspecified whether sciatica present  Ambulatory Referral to Podiatry                PLAN:  1. Reviewed medical records. Reviewed the note from PCP. Patient was counseled and educated on the condition and the diagnosis. 2. The diagnosis, treatment options and prognosis were discussed with the patient. 3. He has pes planus with symptoms of plantar fasciitis. Sent him for PT/OT. 4. Recommended orthotics and will call his insurance for benefits. 5. Instructed supportive care, home exercise, icing, and proper footwear/ arch support. Discussed possible images depending on the progress. 6. Patient will return in 6 weeks for re-evaluation. Imaging: I have personally reviewed pertinent films in PACS  Labs, pathology, and Other Studies: I have personally reviewed pertinent reports. Subjective:       HPI  The patient was referred to my office for evaluation of bilateral foot pain and flatfoot. He had pain in arch for years. Pain is about 3-7 out of 10 depending on the days. He related this to flatfoot. Denied any self treatment other than taking OTC meds as needed. He also has some post-static dyskinesia in arch bilaterally. No acute swelling. No redness or warmth. The patient does not recall any injury. No associated numbness or paresthesia. No significant weakness or dysfunction.          The following portions of the patient's history were reviewed and updated as appropriate: allergies, current medications, past family history, past medical history, past social history, past surgical history and problem list.  All pertinent labs and images were reviewed. Past Medical History  Past Medical History:   Diagnosis Date   • Hypertension        Past Surgical History  Past Surgical History:   Procedure Laterality Date   • NO PAST SURGERIES          Allergies:  Patient has no known allergies. Medications:  Current Outpatient Medications   Medication Sig Dispense Refill   • cyclobenzaprine (FLEXERIL) 10 mg tablet Take 1 tablet (10 mg total) by mouth 3 (three) times a day as needed for muscle spasms 20 tablet 0   • lisinopril (ZESTRIL) 10 mg tablet Take 1 tablet (10 mg total) by mouth daily 90 tablet 1     No current facility-administered medications for this visit. Social History:  Social History     Socioeconomic History   • Marital status: /Civil Union     Spouse name: None   • Number of children: None   • Years of education: None   • Highest education level: None   Occupational History   • None   Tobacco Use   • Smoking status: Never   • Smokeless tobacco: Never   Vaping Use   • Vaping Use: Never used   Substance and Sexual Activity   • Alcohol use: Yes     Alcohol/week: 1.0 standard drink of alcohol     Types: 1 Shots of liquor per week     Comment: weekends   • Drug use: Never   • Sexual activity: None   Other Topics Concern   • None   Social History Narrative    · Most recent tobacco use screenin2019      · Do you currently or have you served in the [address] [place] [address]:   No      · Were you activated, into active duty, as a member of the [employer] or as a Reservist:   No      Social Determinants of Health     Financial Resource Strain: Not on file   Food Insecurity: Not on file   Transportation Needs: Not on file   Physical Activity: Not on file   Stress: Not on file   Social Connections: Not on file   Intimate Partner Violence: Not on file   Housing Stability: Not on file          Review of Systems   Constitutional: Negative for chills and fever. HENT: Negative for sore throat.     Respiratory: Negative for cough and shortness of breath. Cardiovascular: Negative for chest pain. Gastrointestinal: Negative for nausea and vomiting. Musculoskeletal: Negative for gait problem. Skin: Negative for wound. Allergic/Immunologic: Negative for immunocompromised state. Neurological: Negative for weakness and numbness. Hematological: Negative. Psychiatric/Behavioral: Negative for behavioral problems and confusion. Objective:      BP (!) 144/101   Pulse 68   Ht 5' 9" (1.753 m)   Wt 75.8 kg (167 lb) Comment: last weight entered was incorrect  BMI 24.66 kg/m²          Physical Exam  Vitals reviewed. Constitutional:       General: He is not in acute distress. Appearance: Normal appearance. He is not toxic-appearing or diaphoretic. HENT:      Head: Normocephalic and atraumatic. Eyes:      Extraocular Movements: Extraocular movements intact. Cardiovascular:      Rate and Rhythm: Normal rate and regular rhythm. Pulses: Normal pulses. Dorsalis pedis pulses are 2+ on the right side and 2+ on the left side. Posterior tibial pulses are 2+ on the right side and 2+ on the left side. Pulmonary:      Effort: Pulmonary effort is normal. No respiratory distress. Musculoskeletal:         General: Deformity present. No swelling, tenderness or signs of injury. Cervical back: Normal range of motion and neck supple. Right lower leg: No edema. Left lower leg: No edema. Right foot: No foot drop. Left foot: No foot drop. Comments: Pes planus presents bilaterally. No reproducible pain on palpation today. PTT function intact. Skin:     General: Skin is warm and dry. Capillary Refill: Capillary refill takes less than 2 seconds. Coloration: Skin is not cyanotic or mottled. Findings: No abscess, ecchymosis or wound. Nails: There is no clubbing. Neurological:      General: No focal deficit present.       Mental Status: He is alert and oriented to person, place, and time. Cranial Nerves: No cranial nerve deficit. Sensory: No sensory deficit. Motor: No weakness. Coordination: Coordination normal.   Psychiatric:         Mood and Affect: Mood normal.         Behavior: Behavior normal.         Thought Content:  Thought content normal.         Judgment: Judgment normal.

## 2024-03-11 DIAGNOSIS — I10 ESSENTIAL HYPERTENSION: ICD-10-CM

## 2024-03-11 RX ORDER — LISINOPRIL 10 MG/1
10 TABLET ORAL DAILY
Qty: 90 TABLET | Refills: 1 | Status: SHIPPED | OUTPATIENT
Start: 2024-03-11

## 2024-03-28 ENCOUNTER — TELEPHONE (OUTPATIENT)
Dept: FAMILY MEDICINE CLINIC | Facility: CLINIC | Age: 54
End: 2024-03-28

## 2024-03-28 NOTE — TELEPHONE ENCOUNTER
I called and left a message in Czech for the patient to call us back to help him reschedule his apt that he cancelled 03/25. His physical is actually not due until 04/20/2024. If he calls back we can help him schedule for after this day.

## 2024-04-29 ENCOUNTER — APPOINTMENT (OUTPATIENT)
Dept: URGENT CARE | Facility: CLINIC | Age: 54
End: 2024-04-29

## 2024-09-16 DIAGNOSIS — I10 ESSENTIAL HYPERTENSION: ICD-10-CM

## 2024-09-16 RX ORDER — LISINOPRIL 10 MG/1
10 TABLET ORAL DAILY
Qty: 90 TABLET | Refills: 0 | Status: SHIPPED | OUTPATIENT
Start: 2024-09-16 | End: 2024-09-24 | Stop reason: SDUPTHER

## 2024-09-24 DIAGNOSIS — I10 ESSENTIAL HYPERTENSION: ICD-10-CM

## 2024-09-24 RX ORDER — LISINOPRIL 10 MG/1
10 TABLET ORAL DAILY
Qty: 90 TABLET | Refills: 0 | Status: SHIPPED | OUTPATIENT
Start: 2024-09-24

## 2024-09-24 NOTE — TELEPHONE ENCOUNTER
Reason for call:   [x] Refill   [] Prior Auth  [x] Other: Not a duplicate, this medication was sent to the [facility] pharmacy last week, please resend to [facility]    Office:   [x] PCP/Provider - Sherrie Rey MD   [] Specialty/Provider -     Medication:  lisinopril (ZESTRIL) 10 mg tablet    Dose/Frequency: Take 1 tablet (10 mg total) by mouth daily     Quantity: 90    Pharmacy: [facility]/pharm[facility] #[id number] - [place] - [address] [phone]    Does the patient have enough for 3 days?   [] Yes   [x] No - Send as HP to POD

## 2024-09-26 ENCOUNTER — TELEPHONE (OUTPATIENT)
Age: 54
End: 2024-09-26

## 2024-09-26 NOTE — TELEPHONE ENCOUNTER
Patient called requesting refill for lisinopril (ZESTRIL) 10 mg tablet. Patient made aware medication was refilled on 9/24/2024 for 90 with 0 refills to [facility] pharmacy. Patient instructed to contact the pharmacy to obtain refills of medication. Patient verbalized understanding.

## 2024-10-25 ENCOUNTER — APPOINTMENT (OUTPATIENT)
Age: 54
End: 2024-10-25
Payer: COMMERCIAL

## 2024-10-25 DIAGNOSIS — Z00.00 ROUTINE GENERAL MEDICAL EXAMINATION AT A HEALTH CARE FACILITY: ICD-10-CM

## 2024-10-25 DIAGNOSIS — I10 HYPERTENSION, ESSENTIAL: ICD-10-CM

## 2024-10-25 DIAGNOSIS — R73.03 PRE-DIABETES: ICD-10-CM

## 2024-10-25 DIAGNOSIS — Z12.5 PROSTATE CANCER SCREENING INFORMATION GIVEN DURING PATIENT ENCOUNTER: ICD-10-CM

## 2024-10-25 LAB
ALBUMIN SERPL BCG-MCNC: 4.3 G/DL (ref 3.5–5)
ALP SERPL-CCNC: 69 U/L (ref 34–104)
ALT SERPL W P-5'-P-CCNC: 34 U/L (ref 7–52)
ANION GAP SERPL CALCULATED.3IONS-SCNC: 7 MMOL/L (ref 4–13)
AST SERPL W P-5'-P-CCNC: 17 U/L (ref 13–39)
BASOPHILS # BLD AUTO: 0.12 THOUSANDS/ΜL (ref 0–0.1)
BASOPHILS NFR BLD AUTO: 1 % (ref 0–1)
BILIRUB SERPL-MCNC: 0.56 MG/DL (ref 0.2–1)
BUN SERPL-MCNC: 16 MG/DL (ref 5–25)
CALCIUM SERPL-MCNC: 9.2 MG/DL (ref 8.4–10.2)
CHLORIDE SERPL-SCNC: 101 MMOL/L (ref 96–108)
CO2 SERPL-SCNC: 29 MMOL/L (ref 21–32)
CREAT SERPL-MCNC: 0.93 MG/DL (ref 0.6–1.3)
EOSINOPHIL # BLD AUTO: 0.35 THOUSAND/ΜL (ref 0–0.61)
EOSINOPHIL NFR BLD AUTO: 4 % (ref 0–6)
ERYTHROCYTE [DISTWIDTH] IN BLOOD BY AUTOMATED COUNT: 12.5 % (ref 11.6–15.1)
EST. AVERAGE GLUCOSE BLD GHB EST-MCNC: 123 MG/DL
GFR SERPL CREATININE-BSD FRML MDRD: 93 ML/MIN/1.73SQ M
GLUCOSE P FAST SERPL-MCNC: 89 MG/DL (ref 65–99)
HBA1C MFR BLD: 5.9 %
HCT VFR BLD AUTO: 50.4 % (ref 36.5–49.3)
HGB BLD-MCNC: 16.5 G/DL (ref 12–17)
IMM GRANULOCYTES # BLD AUTO: 0.03 THOUSAND/UL (ref 0–0.2)
IMM GRANULOCYTES NFR BLD AUTO: 0 % (ref 0–2)
LYMPHOCYTES # BLD AUTO: 2.5 THOUSANDS/ΜL (ref 0.6–4.47)
LYMPHOCYTES NFR BLD AUTO: 29 % (ref 14–44)
MCH RBC QN AUTO: 28.4 PG (ref 26.8–34.3)
MCHC RBC AUTO-ENTMCNC: 32.7 G/DL (ref 31.4–37.4)
MCV RBC AUTO: 87 FL (ref 82–98)
MONOCYTES # BLD AUTO: 0.85 THOUSAND/ΜL (ref 0.17–1.22)
MONOCYTES NFR BLD AUTO: 10 % (ref 4–12)
NEUTROPHILS # BLD AUTO: 4.82 THOUSANDS/ΜL (ref 1.85–7.62)
NEUTS SEG NFR BLD AUTO: 56 % (ref 43–75)
NRBC BLD AUTO-RTO: 0 /100 WBCS
PLATELET # BLD AUTO: 309 THOUSANDS/UL (ref 149–390)
PMV BLD AUTO: 10.6 FL (ref 8.9–12.7)
POTASSIUM SERPL-SCNC: 4.3 MMOL/L (ref 3.5–5.3)
PROT SERPL-MCNC: 7.5 G/DL (ref 6.4–8.4)
PSA SERPL-MCNC: 0.54 NG/ML (ref 0–4)
RBC # BLD AUTO: 5.81 MILLION/UL (ref 3.88–5.62)
SODIUM SERPL-SCNC: 137 MMOL/L (ref 135–147)
WBC # BLD AUTO: 8.67 THOUSAND/UL (ref 4.31–10.16)

## 2024-10-25 PROCEDURE — 80053 COMPREHEN METABOLIC PANEL: CPT

## 2024-10-25 PROCEDURE — G0103 PSA SCREENING: HCPCS

## 2024-10-25 PROCEDURE — 85025 COMPLETE CBC W/AUTO DIFF WBC: CPT

## 2024-10-25 PROCEDURE — 36415 COLL VENOUS BLD VENIPUNCTURE: CPT

## 2024-10-25 PROCEDURE — 83036 HEMOGLOBIN GLYCOSYLATED A1C: CPT

## 2025-02-07 ENCOUNTER — OFFICE VISIT (OUTPATIENT)
Age: 55
End: 2025-02-07

## 2025-02-07 VITALS
WEIGHT: 183.64 LBS | HEIGHT: 69 IN | DIASTOLIC BLOOD PRESSURE: 78 MMHG | HEART RATE: 90 BPM | BODY MASS INDEX: 27.2 KG/M2 | SYSTOLIC BLOOD PRESSURE: 120 MMHG | OXYGEN SATURATION: 97 %

## 2025-02-07 DIAGNOSIS — Z76.89 ENCOUNTER TO ESTABLISH CARE: Primary | ICD-10-CM

## 2025-02-07 DIAGNOSIS — M25.512 ACUTE PAIN OF LEFT SHOULDER: ICD-10-CM

## 2025-02-07 DIAGNOSIS — I10 ESSENTIAL HYPERTENSION: ICD-10-CM

## 2025-02-07 DIAGNOSIS — R73.03 PREDIABETES: ICD-10-CM

## 2025-02-07 DIAGNOSIS — R22.1 NECK MASS: ICD-10-CM

## 2025-02-07 DIAGNOSIS — E78.2 MIXED HYPERLIPIDEMIA: ICD-10-CM

## 2025-02-07 DIAGNOSIS — Z13.29 SCREENING FOR THYROID DISORDER: ICD-10-CM

## 2025-02-07 RX ORDER — LISINOPRIL 20 MG/1
1 TABLET ORAL DAILY
COMMUNITY
Start: 2025-01-26

## 2025-02-07 NOTE — ASSESSMENT & PLAN NOTE
Patient reports that he had a US of dena in 2021 which showed a lipoma; denies mass getting bigger or redness/fevers     
Pt reports that lisinopril was increased to 20mg last year; BP is stable during visit today; denies headaches or dizziness       
Repeat labs ordered   Orders:    Lipid panel; Future    
Repeat labs ordered;     
no

## 2025-02-07 NOTE — PATIENT INSTRUCTIONS
Labs are walk in, patients do not need appointments or lab slips. Blood work is fasting (no food) 8-10 hours before draw. Please no food or liquid other than water and black coffee. Lab results will be sent to provider, provider will reach out regarding results via My Chart messaging or phone call.     Monday-Friday 7am-1pm

## 2025-02-07 NOTE — PROGRESS NOTES
Name: Lauri Davenport      : 1970      MRN: [id number]  Encounter Provider: Estre Montiel  Encounter Date: 2025   Encounter department: [facility]  :  Assessment & Plan  Encounter to establish care  Pt seen in the office today to establish care; patient was just seen at a previous office for an annual physical on 10/2024; pt reports acute left sided shoulder pain; denies numbness or tingling; denies c-spine tenderness    Repeat labs ordered  XR ordered for left shoulder       F/u in 3 months;   Essential hypertension  Pt reports that lisinopril was increased to 20mg last year; BP is stable during visit today; denies headaches or dizziness       Mixed hyperlipidemia  Repeat labs ordered   Orders:    Lipid panel; Future    Prediabetes  Repeat labs ordered;     Acute pain of left shoulder  pt reports acute left sided shoulder pain; denies numbness or tingling; denies c-spine tenderness  Orders:    XR shoulder 2+ vw left; Future    Neck mass  Patient reports that he had a US of [facility] in  which showed a lipoma; denies mass getting bigger or redness/fevers     Screening for thyroid disorder    Orders:    TSH, 3rd generation with Free T4 reflex; Future           History of Present Illness   HPI  Lauri Davenport is a 55 year old male seen in the office today to establish care; patient was just seen at a previous office for an annual physical on 10/2024; pt reports acute left sided shoulder pain; denies numbness or tingling; denies c-spine tenderness    Repeat labs ordered  XR ordered for left shoulder       F/u in 3 months;   Review of Systems   Constitutional:  Negative for chills and fever.   HENT:  Negative for ear pain and sore throat.    Eyes:  Negative for pain and visual disturbance.   Respiratory:  Negative for cough and shortness of breath.    Cardiovascular:  Negative for chest pain and palpitations.   Gastrointestinal:  Negative for abdominal  "pain and vomiting.   Genitourinary:  Negative for dysuria and hematuria.   Musculoskeletal:  Negative for arthralgias and back pain.   Skin:  Negative for color change and rash.   Neurological:  Negative for dizziness, syncope and headaches.   All other systems reviewed and are negative.      Objective   /78 (BP Location: Left arm, Patient Position: Sitting, Cuff Size: Large)   Pulse 90   Ht 5' 9\" (1.753 m)   Wt 83.3 kg (183 lb 10.3 oz)   SpO2 97%   BMI 27.12 kg/m²      Physical Exam  Vitals and nursing note reviewed.   Constitutional:       General: He is not in acute distress.     Appearance: He is well-developed.   HENT:      Head: Normocephalic and atraumatic.   Eyes:      Conjunctiva/sclera: Conjunctivae normal.   Cardiovascular:      Rate and Rhythm: Normal rate and regular rhythm.      Pulses: Normal pulses.      Heart sounds: Normal heart sounds. No murmur heard.  Pulmonary:      Effort: Pulmonary effort is normal. No respiratory distress.      Breath sounds: Normal breath sounds.   Musculoskeletal:         General: Tenderness present.      Left shoulder: Tenderness present. Normal range of motion.      Cervical back: Neck supple.   Skin:     General: Skin is warm and dry.      Capillary Refill: Capillary refill takes less than 2 seconds.   Neurological:      Mental Status: He is alert.   Psychiatric:         Mood and Affect: Mood normal.         "